# Patient Record
Sex: FEMALE | Race: WHITE | URBAN - METROPOLITAN AREA
[De-identification: names, ages, dates, MRNs, and addresses within clinical notes are randomized per-mention and may not be internally consistent; named-entity substitution may affect disease eponyms.]

---

## 2017-02-07 ENCOUNTER — TELEPHONE (OUTPATIENT)
Dept: DERMATOLOGY CLINIC | Facility: CLINIC | Age: 67
End: 2017-02-07

## 2017-02-07 RX ORDER — DOXYCYCLINE HYCLATE 50 MG/1
50 CAPSULE ORAL DAILY
Qty: 30 CAPSULE | Refills: 12 | Status: SHIPPED | OUTPATIENT
Start: 2017-02-07 | End: 2017-06-14

## 2017-02-07 RX ORDER — DOXYCYCLINE 40 MG/1
CAPSULE ORAL
Qty: 30 CAPSULE | Refills: 0 | OUTPATIENT
Start: 2017-02-07

## 2017-02-07 NOTE — TELEPHONE ENCOUNTER
Can try 50 m G doxycycline hyclate daily. Not exactly the same however may be cheaper. Monohydrate might be cheaper still.   Alternative Periostat 20 mg twice daily may not be covered at all as this is for periodontal disease does not have rosacea indicat

## 2017-02-07 NOTE — TELEPHONE ENCOUNTER
Please advise on alterate medication?     Patient had rx transferred to [facility] at [address] to pharmacy and [facility] D.

## 2017-05-12 ENCOUNTER — TELEPHONE (OUTPATIENT)
Dept: OPHTHALMOLOGY | Facility: CLINIC | Age: 67
End: 2017-05-12

## 2017-05-12 NOTE — TELEPHONE ENCOUNTER
Spoke to patient. She has a decrease in her vision of the left eye and wants to be seen prior to 8/29/17. Patient is concerned.   Rescheduled patient to 6/14/17 at 1:00pm/nw

## 2017-05-12 NOTE — TELEPHONE ENCOUNTER
Pt requesting appt sooner than next available, states her L eye vision is blurry. Pls advise thank you.

## 2017-06-14 ENCOUNTER — OFFICE VISIT (OUTPATIENT)
Dept: OPHTHALMOLOGY | Facility: CLINIC | Age: 67
End: 2017-06-14

## 2017-06-14 DIAGNOSIS — H25.13 AGE-RELATED NUCLEAR CATARACT OF BOTH EYES: ICD-10-CM

## 2017-06-14 DIAGNOSIS — H40.003 GLAUCOMA SUSPECT, BILATERAL: Primary | ICD-10-CM

## 2017-06-14 DIAGNOSIS — H43.391 FLOATER, VITREOUS, RIGHT: ICD-10-CM

## 2017-06-14 PROBLEM — Z79.899 LONG-TERM USE OF PLAQUENIL: Status: ACTIVE | Noted: 2017-06-14

## 2017-06-14 PROBLEM — H40.009 GLAUCOMA SUSPECT: Status: ACTIVE | Noted: 2017-06-14

## 2017-06-14 PROBLEM — H43.399 FLOATER, VITREOUS: Status: ACTIVE | Noted: 2017-06-14

## 2017-06-14 PROBLEM — Z79.899 LONG-TERM USE OF PLAQUENIL: Status: RESOLVED | Noted: 2017-06-14 | Resolved: 2017-06-14

## 2017-06-14 PROCEDURE — 92015 DETERMINE REFRACTIVE STATE: CPT | Performed by: OPHTHALMOLOGY

## 2017-06-14 PROCEDURE — 92250 FUNDUS PHOTOGRAPHY W/I&R: CPT | Performed by: OPHTHALMOLOGY

## 2017-06-14 PROCEDURE — 92014 COMPRE OPH EXAM EST PT 1/>: CPT | Performed by: OPHTHALMOLOGY

## 2017-06-14 RX ORDER — LORATADINE 10 MG/1
10 TABLET ORAL DAILY
COMMUNITY

## 2017-06-14 RX ORDER — MULTIVIT-MIN/FOLIC ACID/LUTEIN 400-250MCG
1 TABLET,CHEWABLE ORAL DAILY
COMMUNITY

## 2017-06-14 NOTE — ASSESSMENT & PLAN NOTE
Discussed with patient that she is a glaucoma suspect based on increased cupping of the optic nerves in both eyes. Retinal photos taken today to document optic nerves. Glaucoma diagnostic testing ordered.   Will not start medication, but will continue to

## 2017-06-14 NOTE — PROGRESS NOTES
Yulissa Willingham is a 78year old female. HPI:     HPI     Pt complains of blurred vision in her left eye over the past 4 months. Pt has been OTC Systane OU prn. Patient is off of Plaquenil for the past few years.         Last edited by Nikolas Wilkinson (FOLVITE) 1 MG Oral Tab  Disp:  Rfl: 0   gabapentin (NEURONTIN) 300 MG Oral Cap  Disp:  Rfl: 2   Lisinopril-Hydrochlorothiazide 10-12.5 MG Oral Tab  Disp:  Rfl: 11   methotrexate (RHEUMATREX) 2.5 MG Oral Tab  Disp:  Rfl: 1   naproxen (NAPROSYN) 500 MG Oral glasses      Manifest Refraction (Auto)      Sphere Cylinder Axis Dist Add Near   Right -0.25 Sphere       Left -3.00 +0.50 180            Manifest Refraction #2      Sphere Cylinder Axis Dist Add Near   Right [place] Sphere  20/20 +2.25 20/20   Left -3.00 +

## 2017-06-14 NOTE — PATIENT INSTRUCTIONS
Age-related nuclear cataract of both eyes  Discussed with patient that since her prescription has changed, she could be a \"natural monovision. \"  She could use her right eye for distance and her left eye for near and she could go without glasses.   She was

## 2017-06-14 NOTE — ASSESSMENT & PLAN NOTE
Discussed with patient that since her prescription has changed, she could be a \"natural monovision. \"  She could use her right eye for distance and her left eye for near and she could go without glasses.   She was given optional progressive bifocals, dista

## 2017-07-26 ENCOUNTER — TELEPHONE (OUTPATIENT)
Dept: DERMATOLOGY CLINIC | Facility: CLINIC | Age: 67
End: 2017-07-26

## 2017-07-26 RX ORDER — MINOCYCLINE HYDROCHLORIDE 50 MG/1
50 CAPSULE ORAL DAILY
Qty: 30 CAPSULE | Refills: 6 | Status: SHIPPED | OUTPATIENT
Start: 2017-07-26 | End: 2018-03-04

## 2017-07-26 NOTE — TELEPHONE ENCOUNTER
Pt would like to change from doxycline and would like to change to minocyline per insurance it would be cheaper, pls advise

## 2017-07-26 NOTE — TELEPHONE ENCOUNTER
[id number] Rizwana Goss new rx sent for minocycline.  50mg qd take as she would the doxy not exactly equivalent but ok to try

## 2017-07-27 NOTE — TELEPHONE ENCOUNTER
Per Colt Brock there may be dizziness experieced with minocycline, but less GI issues with it.  Since pt has been on doxy with no problems, most likely she wont have any with minocycline - pt voiced understanding

## 2017-07-27 NOTE — TELEPHONE ENCOUNTER
Dizziness possible,  different from the doxy--otherwise whe has tolerated the doxy would not anticipate more gi/sun issues. May not work as well as the doxy.

## 2018-01-29 ENCOUNTER — TELEPHONE (OUTPATIENT)
Dept: OPHTHALMOLOGY | Facility: CLINIC | Age: 68
End: 2018-01-29

## 2018-01-29 NOTE — TELEPHONE ENCOUNTER
Pt states she has an eye appt with another doctor this week, states she needs her records, TITO informed pt that would be through medical records dept, pt states she contacted medical records and has not received response. Pls advise thank you.

## 2018-01-29 NOTE — TELEPHONE ENCOUNTER
Spoke with patient's  and told him that she can call [phone]. I also told him \"all medical requests for [id number] will now be handled through our record copy , Scan STAT.    Scan STAT is in the [facility]

## 2018-03-01 ENCOUNTER — OFFICE VISIT (OUTPATIENT)
Dept: OTOLARYNGOLOGY | Facility: CLINIC | Age: 68
End: 2018-03-01

## 2018-03-01 VITALS
WEIGHT: 108 LBS | TEMPERATURE: 98 F | DIASTOLIC BLOOD PRESSURE: 64 MMHG | SYSTOLIC BLOOD PRESSURE: 98 MMHG | HEIGHT: 63 IN | BODY MASS INDEX: 19.14 KG/M2

## 2018-03-01 DIAGNOSIS — S01.319A LACERATION OF EAR LOBE, UNSPECIFIED LATERALITY, INITIAL ENCOUNTER: Primary | ICD-10-CM

## 2018-03-01 PROCEDURE — 99203 OFFICE O/P NEW LOW 30 MIN: CPT | Performed by: OTOLARYNGOLOGY

## 2018-03-01 PROCEDURE — G0463 HOSPITAL OUTPT CLINIC VISIT: HCPCS | Performed by: OTOLARYNGOLOGY

## 2018-03-02 NOTE — PROGRESS NOTES
Nolan Ross is a 80year old female.   Patient presents with:  Ear Problem: pt reports would like both ear lobes repaired and cost      HISTORY OF PRESENT ILLNESS  She presents today to discuss possible repair of bilateral earlobes which have been irregular heartbeat/palpitations and syncope. GI Negative Abdominal pain and diarrhea. Endocrine Negative Cold intolerance and heat intolerance. Neuro Negative Tremors. Psych Negative Anxiety and depression.    Integumentary Negative Frequent skin i multiple vitamin Oral Chew Tab, Chew 1 tablet by mouth daily. , Disp: , Rfl:   •  FINACEA 15 % External Gel, APPLY TO AFFECTED AREA(S) EVERY MORNING, Disp: 50 g, Rfl: 2  •  TRETINOIN 0.025 % External Cream, APPLY TO AFFECTED AREA(S) EVERY DAY, Disp: 20 g, R several months before she repierces her ears. Janie Velez.  Lokesh Chung MD    3/1/2018    7:35 PM

## 2018-03-06 RX ORDER — MINOCYCLINE HYDROCHLORIDE 50 MG/1
CAPSULE ORAL
Qty: 30 CAPSULE | Refills: 0 | Status: SHIPPED | OUTPATIENT
Start: 2018-03-06 | End: 2018-04-02

## 2018-03-06 NOTE — TELEPHONE ENCOUNTER
Spoke with pt informed her that her prescription was called into pharmacy and advised her to make an f/u appt and pt verbalized understanding and went ahead and scheduled appt for April.

## 2018-03-07 ENCOUNTER — TELEPHONE (OUTPATIENT)
Dept: OTOLARYNGOLOGY | Facility: CLINIC | Age: 68
End: 2018-03-07

## 2018-03-07 NOTE — TELEPHONE ENCOUNTER
pt called. She canceled her procedure for 3/8/18 with TRENT. She asked me to send a message, she is upset no one has called her prior to this procedure and does not plan to return.

## 2018-03-07 NOTE — TELEPHONE ENCOUNTER
Spoke w/ pt and apologized for not calling regarding her inquiry about the cost of ear lobe repair. Pt informed per Rupert (coding dept), the price for an earlobe repair 2.6 - 5 cm would be approximately $565.   Pt verbalized understanding; she rescheduled he

## 2018-03-08 ENCOUNTER — OFFICE VISIT (OUTPATIENT)
Dept: OTOLARYNGOLOGY | Facility: CLINIC | Age: 68
End: 2018-03-08

## 2018-03-08 VITALS
BODY MASS INDEX: 19.14 KG/M2 | SYSTOLIC BLOOD PRESSURE: 100 MMHG | DIASTOLIC BLOOD PRESSURE: 63 MMHG | HEART RATE: 58 BPM | HEIGHT: 63 IN | WEIGHT: 108 LBS | TEMPERATURE: 96 F

## 2018-03-08 DIAGNOSIS — S01.319A LACERATION OF EAR LOBE, UNSPECIFIED LATERALITY, INITIAL ENCOUNTER: Primary | ICD-10-CM

## 2018-03-08 PROCEDURE — 12051 INTMD RPR FACE/MM 2.5 CM/<: CPT | Performed by: OTOLARYNGOLOGY

## 2018-03-08 RX ORDER — CEPHALEXIN 500 MG/1
500 CAPSULE ORAL EVERY 8 HOURS
Qty: 21 CAPSULE | Refills: 0 | Status: SHIPPED | OUTPATIENT
Start: 2018-03-08 | End: 2018-04-02 | Stop reason: ALTCHOICE

## 2018-03-09 ENCOUNTER — TELEPHONE (OUTPATIENT)
Dept: OTOLARYNGOLOGY | Facility: CLINIC | Age: 68
End: 2018-03-09

## 2018-03-09 NOTE — PROGRESS NOTES
Justin Sharma is a 80year old female.   Patient presents with:  Procedure: Earlobe Laceration Repair on LT side      HISTORY OF PRESENT ILLNESS  She presents today to discuss possible repair of bilateral earlobes which have been injured in the past vision and vision changes. Respiratory Negative Dyspnea and wheezing. Cardio Negative Chest pain, irregular heartbeat/palpitations and syncope. GI Negative Abdominal pain and diarrhea. Endocrine Negative Cold intolerance and heat intolerance.    Lm The left earlobe was infiltrated with 1% Xylocaine with 1-100,000 of epinephrine. A triangular portion of tissue was removed from the earlobe followed by intermediate repair.   Deep subcutaneous tissues were approximated and closed using 5-0 fast-absorbing , Disp: , Rfl: 11  •  methotrexate (RHEUMATREX) 2.5 MG Oral Tab, , Disp: , Rfl: 1  •  naproxen (NAPROSYN) 500 MG Oral Tab, , Disp: , Rfl: 2  •  Sertraline HCl (ZOLOFT) 100 MG Oral Tab, , Disp: , Rfl: 11  •  Zolpidem Tartrate (AMBIEN) 10 MG Oral Tab, , Disp

## 2018-03-09 NOTE — TELEPHONE ENCOUNTER
Pt seen yesterday for earlobe laceration Per pt is ear is not bleeding , pt woke up this morning and noticed small amount of blood on pillow and when pt cleans ear notices a small amount of blood.  Advise pt to monitor, continue to clean as advised and take

## 2018-03-27 ENCOUNTER — TELEPHONE (OUTPATIENT)
Dept: OTOLARYNGOLOGY | Facility: CLINIC | Age: 68
End: 2018-03-27

## 2018-03-27 NOTE — TELEPHONE ENCOUNTER
, please see note below. Pt had ear lobe laceration repair on 03/08/18 in office. Per pt has noticed not all stiches have dissolved.  Pt states she still has some stiches in the back of her ear and tip, pt asking if come in to have removed or give

## 2018-03-28 NOTE — TELEPHONE ENCOUNTER
Yumi Perdomo MD   You 1 hour ago (7:06 AM)      Have her come in for a nurse visit. If their is a stitch it can be removed by the nurses. Does not need to be seen by a physician for this.  All stitches used are dissolvable (Routing comment)

## 2018-03-28 NOTE — TELEPHONE ENCOUNTER
Kai Roberto nurses visit are not yet available to be scheduled.  Pt scheduled for follow up in office for stitch removal.

## 2018-03-29 NOTE — TELEPHONE ENCOUNTER
If she comes in on one of my days then a nurse can remove the stitch but she does not need to be seen by me or another physician.

## 2018-04-02 ENCOUNTER — OFFICE VISIT (OUTPATIENT)
Dept: DERMATOLOGY CLINIC | Facility: CLINIC | Age: 68
End: 2018-04-02

## 2018-04-02 ENCOUNTER — TELEPHONE (OUTPATIENT)
Dept: DERMATOLOGY CLINIC | Facility: CLINIC | Age: 68
End: 2018-04-02

## 2018-04-02 DIAGNOSIS — D22.9 MULTIPLE NEVI: ICD-10-CM

## 2018-04-02 DIAGNOSIS — D23.70 BENIGN NEOPLASM OF SKIN OF LOWER LIMB, INCLUDING HIP, UNSPECIFIED LATERALITY: ICD-10-CM

## 2018-04-02 DIAGNOSIS — L57.0 AK (ACTINIC KERATOSIS): Primary | ICD-10-CM

## 2018-04-02 DIAGNOSIS — L82.1 SEBORRHEIC KERATOSES: ICD-10-CM

## 2018-04-02 DIAGNOSIS — D23.5 BENIGN NEOPLASM OF SKIN OF TRUNK, EXCEPT SCROTUM: ICD-10-CM

## 2018-04-02 DIAGNOSIS — D23.30 BENIGN NEOPLASM OF SKIN OF FACE: ICD-10-CM

## 2018-04-02 DIAGNOSIS — D23.4 BENIGN NEOPLASM OF SCALP AND SKIN OF NECK: ICD-10-CM

## 2018-04-02 DIAGNOSIS — L81.4 LENTIGO: ICD-10-CM

## 2018-04-02 DIAGNOSIS — D23.60 BENIGN NEOPLASM OF SKIN OF UPPER LIMB, INCLUDING SHOULDER, UNSPECIFIED LATERALITY: ICD-10-CM

## 2018-04-02 PROCEDURE — 17000 DESTRUCT PREMALG LESION: CPT | Performed by: DERMATOLOGY

## 2018-04-02 PROCEDURE — 99213 OFFICE O/P EST LOW 20 MIN: CPT | Performed by: DERMATOLOGY

## 2018-04-02 PROCEDURE — 17003 DESTRUCT PREMALG LES 2-14: CPT | Performed by: DERMATOLOGY

## 2018-04-02 RX ORDER — TIMOLOL MALEATE 5 MG/ML
SOLUTION/ DROPS OPHTHALMIC
Refills: 3 | COMMUNITY
Start: 2018-03-23

## 2018-04-02 RX ORDER — MINOCYCLINE HYDROCHLORIDE 50 MG/1
CAPSULE ORAL
Qty: 30 CAPSULE | Refills: 12 | Status: SHIPPED | OUTPATIENT
Start: 2018-04-02 | End: 2019-04-04

## 2018-04-02 RX ORDER — AZELAIC ACID 0.15 G/G
GEL TOPICAL
Qty: 50 G | Refills: 2 | Status: SHIPPED | OUTPATIENT
Start: 2018-04-02 | End: 2022-01-03

## 2018-04-02 RX ORDER — BRIMONIDINE TARTRATE 2 MG/ML
SOLUTION/ DROPS OPHTHALMIC
Refills: 3 | COMMUNITY
Start: 2018-03-23

## 2018-04-02 RX ORDER — KETOTIFEN FUMARATE 0.35 MG/ML
1 SOLUTION/ DROPS OPHTHALMIC
COMMUNITY

## 2018-04-02 RX ORDER — LATANOPROST 50 UG/ML
SOLUTION/ DROPS OPHTHALMIC
Refills: 3 | COMMUNITY
Start: 2018-03-16

## 2018-04-02 RX ORDER — TRETINOIN 0.025 %
CREAM (GRAM) TOPICAL
Qty: 20 G | Refills: 3 | Status: SHIPPED | OUTPATIENT
Start: 2018-04-02 | End: 2020-11-11

## 2018-04-03 NOTE — TELEPHONE ENCOUNTER
Please for rosacea and ak's --pt has been using to face and chest x year with improved management of both.   Medically necessary

## 2018-04-15 NOTE — PROGRESS NOTES
Kira Sanchez is a 80year old female. HPI:     CC:  Patient presents with:  Full Skin Exam: Established pt. LOV- 9-12-16. Pt is presenting today for full body skin rough scaly spots to chest, and lesion to right temple.  Pt denies a personal/family Oral Tab  Disp:  Rfl: 4   Desonide (DESOWEN) 0.05 % Apply Externally Cream Apply  topically. Disp:  Rfl:    triamcinolone acetonide (KENALOG) 0.1 % Apply Externally Cream Apply  topically.  Disp:  Rfl:    anastrozole (ARIMIDEX) 1 MG Oral Tab tab Take  by mo week     Drug use: No    Sexual activity: Not on file     Other Topics Concern    Pt has a pacemaker No    Pt has a defibrillator No    Reaction to local anesthetic No     Social History Narrative   None on file     Family History   Problem Relation Age of Cap 30 capsule 12      Sig: TAKE 1 CAPSULE(50 MG) BY MOUTH DAILY      Azelaic Acid (FINACEA) 15 % External Gel 50 g 2      Sig: APPLY TO AFFECTED AREA(S) EVERY MORNING      tretinoin 0.025 % External Cream 20 g 3      Sig: APPLY TO AFFECTED AREA(S) EVERY D

## 2018-07-09 ENCOUNTER — OFFICE VISIT (OUTPATIENT)
Dept: DERMATOLOGY CLINIC | Facility: CLINIC | Age: 68
End: 2018-07-09

## 2018-07-09 DIAGNOSIS — L57.0 AK (ACTINIC KERATOSIS): Primary | ICD-10-CM

## 2018-07-09 DIAGNOSIS — D23.70 BENIGN NEOPLASM OF SKIN OF LOWER LIMB, INCLUDING HIP, UNSPECIFIED LATERALITY: ICD-10-CM

## 2018-07-09 DIAGNOSIS — D23.60 BENIGN NEOPLASM OF SKIN OF UPPER LIMB, INCLUDING SHOULDER, UNSPECIFIED LATERALITY: ICD-10-CM

## 2018-07-09 DIAGNOSIS — D23.5 BENIGN NEOPLASM OF SKIN OF TRUNK, EXCEPT SCROTUM: ICD-10-CM

## 2018-07-09 DIAGNOSIS — D23.4 BENIGN NEOPLASM OF SCALP AND SKIN OF NECK: ICD-10-CM

## 2018-07-09 DIAGNOSIS — L81.4 LENTIGO: ICD-10-CM

## 2018-07-09 DIAGNOSIS — D22.9 MULTIPLE NEVI: ICD-10-CM

## 2018-07-09 DIAGNOSIS — D23.30 BENIGN NEOPLASM OF SKIN OF FACE: ICD-10-CM

## 2018-07-09 DIAGNOSIS — L82.1 SEBORRHEIC KERATOSES: ICD-10-CM

## 2018-07-09 PROCEDURE — 99213 OFFICE O/P EST LOW 20 MIN: CPT | Performed by: DERMATOLOGY

## 2018-07-09 PROCEDURE — 17003 DESTRUCT PREMALG LES 2-14: CPT | Performed by: DERMATOLOGY

## 2018-07-09 PROCEDURE — 17000 DESTRUCT PREMALG LESION: CPT | Performed by: DERMATOLOGY

## 2018-07-09 NOTE — PROGRESS NOTES
Past Medical History:   Diagnosis Date   • Age-related nuclear cataract of both eyes 8/5/2014   • Allergic contact dermatitis of eyelids of both eyes 11/17/2015   • Breast cancer ([facility] [address]     per [employer]:  lumpectomy   • Eye exam due to high risk medication,

## 2018-07-22 NOTE — PROGRESS NOTES
Jazmyne Roper is a 80year old female. HPI:     CC:  Patient presents with:  Lesion: LOV 4/2/2018. Pt presenting with lesion to L upper leg and chest. Pt denies personal and famiy hx of skin cancer.         Allergies:  Adhesive Tape; Dust Mite Extra Externally Cream Apply  topically. Disp:  Rfl:    triamcinolone acetonide (KENALOG) 0.1 % Apply Externally Cream Apply  topically. Disp:  Rfl:    anastrozole (ARIMIDEX) 1 MG Oral Tab tab Take  by mouth.  Disp:  Rfl:    Fluticasone Propionate (FLONASE) 50 MC has a pacemaker No    Pt has a defibrillator No    Reaction to local anesthetic No     Social History Narrative   None on file     Family History   Problem Relation Age of Onset   • Diabetes Mother    • Glaucoma Neg    • Macular degeneration Neg        The skin of face  Benign neoplasm of skin of lower limb, including hip, unspecified laterality  Benign neoplasm of skin of upper limb, including shoulder, unspecified laterality  Benign neoplasm of skin of trunk, except scrotum  Multiple nevi  Lesion of concer

## 2019-04-04 RX ORDER — MINOCYCLINE HYDROCHLORIDE 50 MG/1
CAPSULE ORAL
Qty: 30 CAPSULE | Refills: 0 | Status: SHIPPED | OUTPATIENT
Start: 2019-04-04 | End: 2019-05-08

## 2019-04-17 ENCOUNTER — TELEPHONE (OUTPATIENT)
Dept: DERMATOLOGY CLINIC | Facility: CLINIC | Age: 69
End: 2019-04-17

## 2019-04-17 NOTE — TELEPHONE ENCOUNTER
LOV 7/9/18 pt with a \"bump-like\" lesion to left leg. States she's had it since dec 2018 but the lesion has become sore.  Appt made for end of April (next available)

## 2019-05-06 ENCOUNTER — OFFICE VISIT (OUTPATIENT)
Dept: DERMATOLOGY CLINIC | Facility: CLINIC | Age: 69
End: 2019-05-06
Payer: MEDICARE

## 2019-05-06 DIAGNOSIS — L71.9 ROSACEA: ICD-10-CM

## 2019-05-06 DIAGNOSIS — D23.5 BENIGN NEOPLASM OF SKIN OF TRUNK, EXCEPT SCROTUM: ICD-10-CM

## 2019-05-06 DIAGNOSIS — L57.0 AK (ACTINIC KERATOSIS): Primary | ICD-10-CM

## 2019-05-06 DIAGNOSIS — D23.4 BENIGN NEOPLASM OF SCALP AND SKIN OF NECK: ICD-10-CM

## 2019-05-06 DIAGNOSIS — D23.30 BENIGN NEOPLASM OF SKIN OF FACE: ICD-10-CM

## 2019-05-06 DIAGNOSIS — L82.1 SEBORRHEIC KERATOSES: ICD-10-CM

## 2019-05-06 DIAGNOSIS — D23.60 BENIGN NEOPLASM OF SKIN OF UPPER LIMB, INCLUDING SHOULDER, UNSPECIFIED LATERALITY: ICD-10-CM

## 2019-05-06 DIAGNOSIS — D22.9 MULTIPLE NEVI: ICD-10-CM

## 2019-05-06 DIAGNOSIS — L81.4 LENTIGO: ICD-10-CM

## 2019-05-06 PROCEDURE — G0463 HOSPITAL OUTPT CLINIC VISIT: HCPCS | Performed by: DERMATOLOGY

## 2019-05-06 PROCEDURE — 99213 OFFICE O/P EST LOW 20 MIN: CPT | Performed by: DERMATOLOGY

## 2019-05-06 RX ORDER — ALENDRONATE SODIUM 70 MG/1
70 TABLET ORAL
COMMUNITY

## 2019-05-06 RX ORDER — ALENDRONATE SODIUM 70 MG/1
TABLET ORAL
Refills: 2 | COMMUNITY
Start: 2019-02-27

## 2019-05-06 RX ORDER — ERYTHROMYCIN 5 MG/G
OINTMENT OPHTHALMIC
Refills: 3 | COMMUNITY
Start: 2019-02-15

## 2019-05-06 RX ORDER — MINOCYCLINE HCL
POWDER (GRAM) MISCELLANEOUS
COMMUNITY

## 2019-05-06 RX ORDER — POLYETHYLENE GLYCOL-3350 AND ELECTROLYTES 236; 6.74; 5.86; 2.97; 22.74 G/274.31G; G/274.31G; G/274.31G; G/274.31G; G/274.31G
POWDER, FOR SOLUTION ORAL
Refills: 0 | COMMUNITY
Start: 2019-03-07

## 2019-05-06 RX ORDER — NEOMYCIN SULFATE, POLYMYXIN B SULFATE, AND DEXAMETHASONE 3.5; 10000; 1 MG/G; [USP'U]/G; MG/G
OINTMENT OPHTHALMIC
Refills: 0 | COMMUNITY
Start: 2019-01-22

## 2019-05-06 RX ORDER — PREDNISOLONE ACETATE 10 MG/ML
SUSPENSION/ DROPS OPHTHALMIC
COMMUNITY
Start: 2019-02-08

## 2019-05-09 RX ORDER — MINOCYCLINE HYDROCHLORIDE 50 MG/1
CAPSULE ORAL
Qty: 60 CAPSULE | Refills: 11 | Status: SHIPPED | OUTPATIENT
Start: 2019-05-09 | End: 2020-07-03

## 2019-05-19 NOTE — PROGRESS NOTES
Maggie Hou is a 77year old female. HPI:     CC:  Patient presents with:  Actinic Keratosis: LOV 7/9/18. pt presenting today with f/u on Actinic Keratosis. pt states needs a refill on Minocycline. Lesion: pt concerned about lesion to left shin. Ophthalmic Solution INT 1 GTT INTO OU BID Disp:  Rfl: 3   Ketotifen Fumarate 0.025 % Ophthalmic Solution 1 drop.  Disp:  Rfl:    latanoprost 0.005 % Ophthalmic Solution INT 1 GTT INTO OU NIGHTLY Disp:  Rfl: 3   Timolol Maleate 0.5 % Ophthalmic Solution INT Date   • Age-related nuclear cataract of both eyes 8/5/2014   • Allergic contact dermatitis of eyelids of both eyes 11/17/2015   • Breast cancer ([facility] [address]     per [employer]:  lumpectomy   • Eye exam due to high risk medication, encounter for    • Long-term use o occupation: Not Asked        Pt has a pacemaker: No        Pt has a defibrillator: No        Breast feeding: Not Asked        Reaction to local anesthetic: No    Social History Narrative      Not on file    Family History   Problem Relation Age of Onset uniformly pigmented, macules and papules 6 mm and less scattered on exam. pigmented lesions examined with dermoscopy benign-appearing patterns. Waxy tannish keratotic papules scattered, cherry-red vascular papules scattered.     See map today's date for lesions. Extensive lentigines, sun damage. Continue regular follow-up    Please refer to map for specific lesions. See additional diagnoses. Pros cons of various therapies, risks benefits discussed. Pathophysiology discussed with patient.   Therapeutic

## 2020-06-29 ENCOUNTER — OFFICE VISIT (OUTPATIENT)
Dept: GASTROENTEROLOGY | Facility: CLINIC | Age: 70
End: 2020-06-29
Payer: MEDICARE

## 2020-06-29 VITALS
DIASTOLIC BLOOD PRESSURE: 56 MMHG | WEIGHT: 107 LBS | HEART RATE: 58 BPM | HEIGHT: 63 IN | BODY MASS INDEX: 18.96 KG/M2 | SYSTOLIC BLOOD PRESSURE: 87 MMHG

## 2020-06-29 DIAGNOSIS — R19.7 DIARRHEA, UNSPECIFIED TYPE: Primary | ICD-10-CM

## 2020-06-29 PROCEDURE — G0463 HOSPITAL OUTPT CLINIC VISIT: HCPCS | Performed by: INTERNAL MEDICINE

## 2020-06-29 PROCEDURE — 99203 OFFICE O/P NEW LOW 30 MIN: CPT | Performed by: INTERNAL MEDICINE

## 2020-07-03 NOTE — PROGRESS NOTES
HPI:    Patient ID: Carlos Bonilla is a 72year old female. HPI  The patient is self-referred. She is seen today with her . She is an excellent historian. The patient has a history of rheumatoid arthritis.   She has been on Cimzia for t 06/29/20 : 107 lb (48.5 kg)  03/08/18 : 108 lb (49 kg)  03/01/18 : 108 lb (49 kg)           Current Outpatient Medications   Medication Sig Dispense Refill   • Minocycline HCl Does not apply Powder Take by mouth.      • alendronate 70 MG Oral Tab Take 70 mg • Lisinopril-Hydrochlorothiazide 10-12.5 MG Oral Tab   11   • methotrexate (RHEUMATREX) 2.5 MG Oral Tab   1   • naproxen (NAPROSYN) 500 MG Oral Tab   2   • Sertraline HCl (ZOLOFT) 100 MG Oral Tab   11   • Zolpidem Tartrate (AMBIEN) 10 MG Oral Tab   4   • M NOT DETECTED   NOT DETECTED   NOT DETECTED   NOT DETECTED   NOT DETECTED   NOT DETECTED   NOT DETECTED   NOT DETECTED   NOT DETECTED   NOT DETECTED   NOT DETECTED   CAMPYLOBACTER   C. DIFFICILE TOXIN A/B   PLEIS.  SHIGELLOIDES   SALMONELLA   VIBRIO SPECIES Meds This Visit:  Requested Prescriptions      No prescriptions requested or ordered in this encounter       Imaging & Referrals:  None       UB#[id number]

## 2020-07-09 ENCOUNTER — TELEPHONE (OUTPATIENT)
Dept: GASTROENTEROLOGY | Facility: CLINIC | Age: 70
End: 2020-07-09

## 2020-07-09 NOTE — TELEPHONE ENCOUNTER
Dr. Toby Castro I spoke with patient she is still having the same diarrhea. She is taking 8 imodium/day with no relief. She denies any fevers. She states her symptoms are the exact same as when they started in March.     She would like to try kj

## 2020-07-09 NOTE — TELEPHONE ENCOUNTER
Patient called in stating that the Imodium is not working properly. She would like to talk about a different benesodie medication that the  had talked about previously.  Patient is requesting to speak directly to Dr. Triplett Grace [phone] Please advise

## 2020-07-09 NOTE — TELEPHONE ENCOUNTER
Diagnosis: Diarrhea. May use a fleets enema preparation. No sedation, IV sedation or monitored anesthesia care per patient preference/scheduling.

## 2020-07-09 NOTE — TELEPHONE ENCOUNTER
I cannot begin budesonide unless we have a definite diagnosis of microscopic colitis. This would require at least a flexible sigmoidoscopy with biopsies of the left colon.   Additional options would include a stool test to detect inflammation (a positive t

## 2020-07-09 NOTE — TELEPHONE ENCOUNTER
GI Schedulers:    Please contact patient to scheduled sigmoidoscopy per Dr Jaqui Suarez orders below. She is on lisinopril-HCTZ. Thank You.

## 2020-07-09 NOTE — TELEPHONE ENCOUNTER
Dr. Karrie Hackett is agreeable to a sigmoidoscopy for diagnosis. Please provide orders and preferred prep/enema.     Thank You

## 2020-07-13 NOTE — TELEPHONE ENCOUNTER
Spoke with kahti she is waiting to speak with the surgery schedulers.     PHONE ROOM: Please route further calls to GI Schedulers @ [facility] SURG/PROC scheduling

## 2020-07-13 NOTE — TELEPHONE ENCOUNTER
GI schedulers:    Please contact patient to schedule sigmoidoscopy see orders in notes below    Thank you

## 2020-07-31 NOTE — TELEPHONE ENCOUNTER
Left message to call back to see if patient is transferring care to [facility]. Op report copied below from care everywhere    Sig.  Completed @ [place] by Dr. Piedra File on 7-, waiting path results from care everywhere

## 2020-07-31 NOTE — TELEPHONE ENCOUNTER
Sigmoidoscopy (07/30/2020 7:29 AM CDT)  Sigmoidoscopy (07/30/2020 7:29 AM CDT)   Component Value Ref Range Performed At Pathologist [address]  GI  _______________________________________________________________                   monitored continuously. The [id number] was                                   introduced through the anus and advanced to                                   the splenic flexure.  The flexible                                   sigmoidoscopy was 8:57:26 AM  This report has been signed electronically.   Number of Addenda: 0    Note Initiated On: 7/30/2020 7:29 AM

## 2020-08-03 NOTE — TELEPHONE ENCOUNTER
Left message to call back to clarify if pt is transferring care to [facility]

## 2020-08-27 RX ORDER — MINOCYCLINE HYDROCHLORIDE 50 MG/1
CAPSULE ORAL
Qty: 60 CAPSULE | Refills: 0 | Status: SHIPPED | OUTPATIENT
Start: 2020-08-27 | End: 2020-09-17

## 2020-09-16 NOTE — TELEPHONE ENCOUNTER
Patient called-- yearly check scheduled for 11/11/20.     Asking for refill of Minocycline HCl 50 MG Oral Cap    LOV 5/6/2019

## 2020-09-17 RX ORDER — MINOCYCLINE HYDROCHLORIDE 50 MG/1
CAPSULE ORAL
Qty: 60 CAPSULE | Refills: 1 | Status: SHIPPED | OUTPATIENT
Start: 2020-09-17 | End: 2020-11-05

## 2020-11-05 RX ORDER — MINOCYCLINE HYDROCHLORIDE 50 MG/1
CAPSULE ORAL
Qty: 60 CAPSULE | Refills: 0 | Status: SHIPPED | OUTPATIENT
Start: 2020-11-05 | End: 2021-03-06

## 2020-11-05 NOTE — TELEPHONE ENCOUNTER
Patient called-    Asking for refill of Minocycline. Has appt on 11/11 but will be out of medication.  Please call     LOV 5/6/2019

## 2020-11-05 NOTE — TELEPHONE ENCOUNTER
Dr. Rajendra العراقي, Please see message below an advise for refill of Minocycline. LOV 5/6/19     next appointment  11/11/20. Per patient will be out of medication before appointment.     Refill pended

## 2020-11-11 ENCOUNTER — OFFICE VISIT (OUTPATIENT)
Dept: DERMATOLOGY CLINIC | Facility: CLINIC | Age: 70
End: 2020-11-11
Payer: MEDICARE

## 2020-11-11 DIAGNOSIS — D23.4 BENIGN NEOPLASM OF SCALP AND SKIN OF NECK: ICD-10-CM

## 2020-11-11 DIAGNOSIS — C44.629 SCC (SQUAMOUS CELL CARCINOMA), ARM, LEFT: ICD-10-CM

## 2020-11-11 DIAGNOSIS — L82.1 SEBORRHEIC KERATOSES: ICD-10-CM

## 2020-11-11 DIAGNOSIS — L81.4 LENTIGO: ICD-10-CM

## 2020-11-11 DIAGNOSIS — D48.5 NEOPLASM OF UNCERTAIN BEHAVIOR OF SKIN: Primary | ICD-10-CM

## 2020-11-11 DIAGNOSIS — D22.9 MULTIPLE NEVI: ICD-10-CM

## 2020-11-11 DIAGNOSIS — D23.5 BENIGN NEOPLASM OF SKIN OF TRUNK, EXCEPT SCROTUM: ICD-10-CM

## 2020-11-11 DIAGNOSIS — L71.9 ROSACEA: ICD-10-CM

## 2020-11-11 DIAGNOSIS — D23.60 BENIGN NEOPLASM OF SKIN OF UPPER LIMB, INCLUDING SHOULDER, UNSPECIFIED LATERALITY: ICD-10-CM

## 2020-11-11 DIAGNOSIS — D23.30 BENIGN NEOPLASM OF SKIN OF FACE: ICD-10-CM

## 2020-11-11 DIAGNOSIS — L57.0 AK (ACTINIC KERATOSIS): ICD-10-CM

## 2020-11-11 PROCEDURE — 99213 OFFICE O/P EST LOW 20 MIN: CPT | Performed by: DERMATOLOGY

## 2020-11-11 PROCEDURE — 88305 TISSUE EXAM BY PATHOLOGIST: CPT | Performed by: DERMATOLOGY

## 2020-11-11 PROCEDURE — 17262 DSTRJ MAL LES T/A/L 1.1-2.0: CPT | Performed by: DERMATOLOGY

## 2020-11-11 PROCEDURE — 17003 DESTRUCT PREMALG LES 2-14: CPT | Performed by: DERMATOLOGY

## 2020-11-11 PROCEDURE — 17000 DESTRUCT PREMALG LESION: CPT | Performed by: DERMATOLOGY

## 2020-11-11 PROCEDURE — G0463 HOSPITAL OUTPT CLINIC VISIT: HCPCS | Performed by: DERMATOLOGY

## 2020-11-11 RX ORDER — TRETINOIN 0.025 %
CREAM (GRAM) TOPICAL
Qty: 20 G | Refills: 3 | Status: SHIPPED | OUTPATIENT
Start: 2020-11-11

## 2020-11-12 ENCOUNTER — TELEPHONE (OUTPATIENT)
Dept: DERMATOLOGY CLINIC | Facility: CLINIC | Age: 70
End: 2020-11-12

## 2020-11-13 NOTE — PROGRESS NOTES
The pathology report from last visit showed  SCC well diff,   left anterior upper arm E&c'ed at visit. Should have been adequately treated. Continue sun protection. Please log in test results.   Please call patient and inform of results and recommendation

## 2020-11-13 NOTE — PROGRESS NOTES
Spoke with patient. Verified name and . Informed patient of test results per Dr. Coleman Garcia and recommendations and to return check for full skin exam in 4-6 months if healing well or as needed.  Patient verbalizes understanding of test results and recommend

## 2020-11-17 ENCOUNTER — OFFICE VISIT (OUTPATIENT)
Dept: OTOLARYNGOLOGY | Facility: CLINIC | Age: 70
End: 2020-11-17
Payer: MEDICARE

## 2020-11-17 VITALS
HEART RATE: 50 BPM | SYSTOLIC BLOOD PRESSURE: 116 MMHG | WEIGHT: 107 LBS | BODY MASS INDEX: 18.96 KG/M2 | HEIGHT: 63 IN | DIASTOLIC BLOOD PRESSURE: 74 MMHG

## 2020-11-17 DIAGNOSIS — H61.23 BILATERAL IMPACTED CERUMEN: Primary | ICD-10-CM

## 2020-11-17 PROCEDURE — 69210 REMOVE IMPACTED EAR WAX UNI: CPT | Performed by: OTOLARYNGOLOGY

## 2020-11-17 NOTE — PROGRESS NOTES
Gaetano Tuttle is a 72year old female.   Patient presents with:  Hearing Problem: c/o difficulty hearing from Left ear, reports recent water in her ear      HISTORY OF PRESENT ILLNESS  She presents today to discuss possible repair of bilateral earlob SCC (squamous cell carcinoma), arm, left 11/13/2020    left anterio upper arm       Past Surgical History:   Procedure Laterality Date   • COLONOSCOPY         REVIEW OF SYSTEMS    System Neg/Pos Details   Constitutional Negative Fatigue, fever and weight l •  Minocycline HCl 50 MG Oral Cap, TAKE ONE CAPSULE BY MOUTH DAILY, Disp: 60 capsule, Rfl: 0  •  alendronate 70 MG Oral Tab, TK 1 T PO WEEKLY, Disp: , Rfl: 2  •  Ketotifen Fumarate 0.025 % Ophthalmic Solution, 1 drop., Disp: , Rfl:   •  latanoprost 0.005 Disp: , Rfl: 0  •  GAVILYTE-G 236 g Oral Recon Soln, TAKE 4000 ML PO AS ONE DOSE, Disp: , Rfl: 0  •  prednisoLONE acetate 1 % Ophthalmic Suspension, , Disp: , Rfl:   •  brimonidine Tartrate 0.2 % Ophthalmic Solution, INT 1 GTT INTO OU BID, Disp: , Rfl: 3

## 2020-11-22 NOTE — PROGRESS NOTES
Kira Sanchez is a 72year old female.   HPI:     CC:  Patient presents with:  Full Skin Exam: pt is here for full body check, denies personal and family HX of skin cancer, LOV 5/6/19   Lesion: pt c/o bump/ lesions on arms, shoulders,    Derm Problem up to 2 weeks.      • hydrocortisone 2.5 % External Ointment CARA THIN LAYER EXT AA BID FOR UP TO 2 WKS PRN  1   • Neomycin-Polymyxin-Dexameth 3.5-46116-4.1 Ophthalmic Ointment   0   • GAVILYTE-G 236 g Oral Recon Soln TAKE 4000 ML PO AS ONE DOSE  0   • predn of eyelids of both eyes 11/17/2015   • Breast cancer ([facility] [address]     per [employer]:  lumpectomy   • Eye exam due to high risk medication, encounter for    • Long-term use of Plaquenil    • Other and unspecified hyperlipidemia    • Rosacea    • SCC (squamous cell c Asked        Outdoor occupation: Not Asked        Pt has a pacemaker: No        Pt has a defibrillator: No        Breast feeding: Not Asked        Reaction to local anesthetic: No    Social History Narrative      Not on file    Family History   Problem Rel uniformly pigmented, macules and papules 6 mm and less scattered on exam. pigmented lesions examined with dermoscopy benign-appearing patterns. Waxy tannish keratotic papules scattered, cherry-red vascular papules scattered.     See map today's date for reassurance given    Erythematous scaling keratotic papules  Arms hands chestx6 actinic keratoses. Precancerous nature discussed. Lesions treated with cryo- . Biopsy if not resolved. x6    More diffuse sun damage consider additional topicals.     [employer] voice recognition software. Please contact me regarding any confusion resulting from errors in recognition. The patient indicates understanding of these issues and agrees to the plan.   The patient is asked to return as noted in follow-up/ abov

## 2020-12-05 ENCOUNTER — LAB REQUISITION (OUTPATIENT)
Dept: LAB | Facility: HOSPITAL | Age: 70
End: 2020-12-05
Payer: MEDICARE

## 2020-12-05 DIAGNOSIS — Z01.818 ENCOUNTER FOR OTHER PREPROCEDURAL EXAMINATION: ICD-10-CM

## 2021-03-06 RX ORDER — MINOCYCLINE HYDROCHLORIDE 50 MG/1
CAPSULE ORAL
Qty: 60 CAPSULE | Refills: 1 | Status: SHIPPED | OUTPATIENT
Start: 2021-03-06 | End: 2021-09-01

## 2021-04-26 ENCOUNTER — TELEPHONE (OUTPATIENT)
Dept: DERMATOLOGY CLINIC | Facility: CLINIC | Age: 71
End: 2021-04-26

## 2021-04-26 NOTE — TELEPHONE ENCOUNTER
S/w pt to inquire why we are sending the path to Dr. Lyubov Siegel. Pt had a bx done Nov 2020 which revelaed SCC, see copied note: \"The pathology report from last visit showed  SCC well diff,   left anterior upper arm E&c'ed at visit. Should have been adequately treated. Continue sun protection. Please log in test results. Please call patient and inform of results and recommendations.  (please add to history). Pt to  rtc for f/u skin exam 4-6 mos if healing well   or prn. \"    Pt states she saw her PCP Dr. Ann Carlson and she referred her to Dr. Lyubov Siegel for further tx of this SCC - I did inform pt that this was treated by [facility] and she is due for F/U - pt states Dr. Ann Carlson told her this needs to be removed surgically - pt insists on sending path to Dr. Lyubov Siegel - staff please fax

## 2021-04-27 NOTE — TELEPHONE ENCOUNTER
Patient may of course seek therapy however if there is an issue would have been appreciated if patient discussed with our office. Of course may seek dermatology care elsewhere.     Handout from PCP indicates ENC is appropriate treatment for some skin cancers as determined by dermatologist.  Not clear why PCP determined this needs further surgery    Okay to move up her appointment or cancel altogether if she wishes    Fine to send path to Dr. Brody Shultz

## 2021-04-28 NOTE — TELEPHONE ENCOUNTER
Dr Huffman [place] office  Fax # [phone] call back and states didn't received the path report.  Please resend

## 2021-06-21 ENCOUNTER — MED REC SCAN ONLY (OUTPATIENT)
Dept: DERMATOLOGY CLINIC | Facility: CLINIC | Age: 71
End: 2021-06-21

## 2021-06-23 ENCOUNTER — OFFICE VISIT (OUTPATIENT)
Dept: DERMATOLOGY CLINIC | Facility: CLINIC | Age: 71
End: 2021-06-23
Payer: MEDICARE

## 2021-06-23 DIAGNOSIS — D23.9 BENIGN NEOPLASM OF SKIN, UNSPECIFIED LOCATION: ICD-10-CM

## 2021-06-23 DIAGNOSIS — D22.9 MULTIPLE NEVI: ICD-10-CM

## 2021-06-23 DIAGNOSIS — Z85.828 HISTORY OF NONMELANOMA SKIN CANCER: ICD-10-CM

## 2021-06-23 DIAGNOSIS — L82.1 SEBORRHEIC KERATOSES: ICD-10-CM

## 2021-06-23 DIAGNOSIS — L57.0 AK (ACTINIC KERATOSIS): Primary | ICD-10-CM

## 2021-06-23 DIAGNOSIS — L81.4 LENTIGO: ICD-10-CM

## 2021-06-23 PROCEDURE — 17003 DESTRUCT PREMALG LES 2-14: CPT | Performed by: DERMATOLOGY

## 2021-06-23 PROCEDURE — 17000 DESTRUCT PREMALG LESION: CPT | Performed by: DERMATOLOGY

## 2021-06-23 PROCEDURE — 99213 OFFICE O/P EST LOW 20 MIN: CPT | Performed by: DERMATOLOGY

## 2021-06-28 NOTE — PROGRESS NOTES
Rosemary Cedeno is a 80year old female.   HPI:     CC:  Patient presents with:  Full Skin Exam: LOV 11/11/20 Patient present for full body skin exam.Patient has hx of scc         Allergies:  Adhesive Tape and Dust Mite Extract    HISTORY:    Past Medi EXT AA BID FOR UP TO 2 WKS PRN  1   • Neomycin-Polymyxin-Dexameth 3.5-20245-7.1 Ophthalmic Ointment   0   • GAVILYTE-G 236 g Oral Recon Soln TAKE 4000 ML PO AS ONE DOSE  0   • prednisoLONE acetate 1 % Ophthalmic Suspension      • brimonidine Tartrate 0.2 % cataract of both eyes 8/5/2014   • Allergic contact dermatitis of eyelids of both eyes 11/17/2015   • [facility]     per [employer]:  lumpectomy   • Eye exam due to high risk medication, encounter for    • Long-term use of Plaquenil    • Other and un Frequency of Social Gatherings with Friends and Family:       Attends Yarsanism Services:       Active Member of Clubs or Organizations:       Attends Club or Organization Meetings:       Marital Status:   Intimate Partner Violence:       Fear of Current o chest,/ breasts, axillae,  abdomen, arms, legs, palms. Multiple light to medium brown, well marginated, uniformly pigmented, macules and papules 6 mm and less scattered on exam. pigmented lesions examined with dermoscopy benign-appearing patterns. continue current medication. Consider tapering few larger inflammatory papular nodules at times. Continue topicals also refill if needed see medications in grid. Skin care regimen discussed at length including cleansers, makeup, face washing, sunscreen.

## 2021-07-26 ENCOUNTER — OFFICE VISIT (OUTPATIENT)
Dept: OTOLARYNGOLOGY | Facility: CLINIC | Age: 71
End: 2021-07-26
Payer: MEDICARE

## 2021-07-26 VITALS — WEIGHT: 108 LBS | BODY MASS INDEX: 18.44 KG/M2 | TEMPERATURE: 98 F | HEIGHT: 64 IN

## 2021-07-26 DIAGNOSIS — H61.23 BILATERAL IMPACTED CERUMEN: Primary | ICD-10-CM

## 2021-07-26 PROCEDURE — 69210 REMOVE IMPACTED EAR WAX UNI: CPT | Performed by: OTOLARYNGOLOGY

## 2021-09-01 RX ORDER — MINOCYCLINE HYDROCHLORIDE 50 MG/1
CAPSULE ORAL
Qty: 60 CAPSULE | Refills: 1 | Status: SHIPPED | OUTPATIENT
Start: 2021-09-01 | End: 2021-12-31

## 2021-09-15 ENCOUNTER — OFFICE VISIT (OUTPATIENT)
Dept: DERMATOLOGY CLINIC | Facility: CLINIC | Age: 71
End: 2021-09-15
Payer: MEDICARE

## 2021-09-15 DIAGNOSIS — Z85.828 HISTORY OF NONMELANOMA SKIN CANCER: ICD-10-CM

## 2021-09-15 DIAGNOSIS — L81.4 LENTIGO: ICD-10-CM

## 2021-09-15 DIAGNOSIS — L57.0 AK (ACTINIC KERATOSIS): Primary | ICD-10-CM

## 2021-09-15 DIAGNOSIS — D23.9 BENIGN NEOPLASM OF SKIN, UNSPECIFIED LOCATION: ICD-10-CM

## 2021-09-15 DIAGNOSIS — L57.8 SUN-DAMAGED SKIN: ICD-10-CM

## 2021-09-15 DIAGNOSIS — L82.1 SEBORRHEIC KERATOSES: ICD-10-CM

## 2021-09-15 PROCEDURE — 99213 OFFICE O/P EST LOW 20 MIN: CPT | Performed by: DERMATOLOGY

## 2021-09-15 PROCEDURE — 17003 DESTRUCT PREMALG LES 2-14: CPT | Performed by: DERMATOLOGY

## 2021-09-15 PROCEDURE — 17000 DESTRUCT PREMALG LESION: CPT | Performed by: DERMATOLOGY

## 2021-09-16 NOTE — PROGRESS NOTES
Add tretinoin for ak's chest and arms and face especially 8 active ak's after efudex at risk for aggressive scc, chemoprevention necessary

## 2021-09-26 NOTE — PROGRESS NOTES
Remington Amador is a 80year old female.   HPI:     CC:  Patient presents with:  Derm Problem: pt presents for follow up on lesion on LT wrist, pt states has cleared,  denies family HX of skin cancer, personal HX of AK's,  LOV 6/23/2021         Angela Brannon Ointment   3   • hydrocortisone 2.5 % External Ointment Apply thin layer to affected area twice daily as needed for up to 2 weeks.      • hydrocortisone 2.5 % External Ointment CARA THIN LAYER EXT AA BID FOR UP TO 2 WKS PRN  1   • Neomycin-Polymyxin-Dexameth History:   Diagnosis Date   • Age-related nuclear cataract of both eyes 8/5/2014   • Allergic contact dermatitis of eyelids of both eyes 11/17/2015   • Breast cancer ([facility] [address]     per [employer]:  lumpectomy   • Eye exam due to high risk medication, encounter for file  Stress:       Feeling of Stress : Not on file  Social Connections:       Frequency of Communication with Friends and Family: Not on file      Frequency of Social Gatherings with Friends and Family: Not on file      Attends Buddhist Services: Not on with concerns above. Patient has been in their usual state of health. History, medications, allergies reviewed as noted. ROS:  Denies any other systemic complaints. No new or changeing lesions other than noted above.  No fevers, chills, night swea Chest arms legs temples  Actinic Keratoses. Precancerous nature discussed. Sun protection, sunscreen/ blocks encouraged Lesions treated with cryo- . Biopsy if not resolved.     x8  Discussed use of fluorouracil strongly recommended using this especially keratoses. No new suspicious lesions. Extensive lentigines, sun damage. Continue regular follow-up stressed again need for sun protection    Patient high risk for further skin cancers    Please refer to map for specific lesions.   See additional diagno

## 2021-11-16 ENCOUNTER — OFFICE VISIT (OUTPATIENT)
Dept: AUDIOLOGY | Facility: CLINIC | Age: 71
End: 2021-11-16
Payer: MEDICARE

## 2021-11-16 ENCOUNTER — OFFICE VISIT (OUTPATIENT)
Dept: OTOLARYNGOLOGY | Facility: CLINIC | Age: 71
End: 2021-11-16
Payer: MEDICARE

## 2021-11-16 VITALS — HEIGHT: 64 IN | WEIGHT: 108 LBS | BODY MASS INDEX: 18.44 KG/M2

## 2021-11-16 DIAGNOSIS — H61.23 BILATERAL IMPACTED CERUMEN: Primary | ICD-10-CM

## 2021-11-16 DIAGNOSIS — H61.23 IMPACTED CERUMEN, BILATERAL: Primary | ICD-10-CM

## 2021-11-16 PROCEDURE — 92557 COMPREHENSIVE HEARING TEST: CPT | Performed by: AUDIOLOGIST

## 2021-11-16 PROCEDURE — G0268 REMOVAL OF IMPACTED WAX MD: HCPCS | Performed by: OTOLARYNGOLOGY

## 2021-11-16 PROCEDURE — 92567 TYMPANOMETRY: CPT | Performed by: AUDIOLOGIST

## 2021-11-16 NOTE — PROGRESS NOTES
Sheridan Donaldson is a 80year old female.   Patient presents with:  Ear Wax  Hearing Loss      HISTORY OF PRESENT ILLNESS    Patient presents for cerumen removal. No other complaints or concerns at this time    Social History    Socioeconomic History Hema/Lymph Negative Easy bleeding and easy bruising.            PHYSICAL EXAM    Ht 5' 4\" (1.626 m)   Wt 108 lb (49 kg)   BMI 18.54 kg/m²        Constitutional Normal Overall appearance - Normal.        Neck Exam Normal Inspection - Normal. Palpation - N EVERY MORNING, Disp: 50 g, Rfl: 2  •  loratadine 10 MG Oral Tab, Take 10 mg by mouth daily. , Disp: , Rfl:   •  multiple vitamin Oral Chew Tab, Chew 1 tablet by mouth daily. , Disp: , Rfl:   •  Montelukast Sodium (SINGULAIR) 10 MG Oral Tab, , Disp: , Rfl: 0 GAVILYTE-G 236 g Oral Recon Soln, TAKE 4000 ML PO AS ONE DOSE (Patient not taking: Reported on 11/16/2021), Disp: , Rfl: 0  •  prednisoLONE acetate 1 % Ophthalmic Suspension, , Disp: , Rfl:   •  Ketotifen Fumarate 0.025 % Ophthalmic Solution, 1 drop.  (Angie

## 2021-12-31 RX ORDER — MINOCYCLINE HYDROCHLORIDE 50 MG/1
CAPSULE ORAL
Qty: 60 CAPSULE | Refills: 2 | Status: SHIPPED | OUTPATIENT
Start: 2021-12-31

## 2021-12-31 NOTE — TELEPHONE ENCOUNTER
LOV 9/2021 - Last office visit notes state pt to consider tapering from the Minocycline. LMTCB for an update from pt. Please advise on refill. Thank you.

## 2021-12-31 NOTE — TELEPHONE ENCOUNTER
Spoke with pt and she states she did request the refill. Pt would like to continue on the medication until her appt in March 2022 and would like to discuss the possibility of alternative treatments at that time.   Pt states she does feel like it is still w

## 2022-01-03 NOTE — TELEPHONE ENCOUNTER
Pt informed of below KMT's recommendations, voiced understanding. She needs a refill on finacea as well. Pended.

## 2022-01-04 RX ORDER — AZELAIC ACID 0.15 G/G
GEL TOPICAL
Qty: 50 G | Refills: 2 | Status: SHIPPED | OUTPATIENT
Start: 2022-01-04

## 2022-01-11 ENCOUNTER — TELEPHONE (OUTPATIENT)
Dept: DERMATOLOGY CLINIC | Facility: CLINIC | Age: 72
End: 2022-01-11

## 2022-01-11 NOTE — TELEPHONE ENCOUNTER
Patient called    Was prescribed Azelaic Acid (FINACEA) 15 % External Gel. Is this for Rosacea?  Please call

## 2022-01-25 ENCOUNTER — TELEPHONE (OUTPATIENT)
Dept: DERMATOLOGY CLINIC | Facility: CLINIC | Age: 72
End: 2022-01-25

## 2022-01-25 NOTE — TELEPHONE ENCOUNTER
Patient called    Asking to give update on how medication is not working. Rash is spreading near eyebrow.  Please call

## 2022-01-25 NOTE — TELEPHONE ENCOUNTER
Pt states the irritation on her face is getting worse and she is getting uncomfortable. Pt currently using tretinoin at night and Finacea in the morning. Pt asking for sooner appt, as soon as possible.   Pt scheduled with KMT on 1/26/22 to reassess humberto

## 2022-01-26 ENCOUNTER — OFFICE VISIT (OUTPATIENT)
Dept: DERMATOLOGY CLINIC | Facility: CLINIC | Age: 72
End: 2022-01-26
Payer: MEDICARE

## 2022-01-26 DIAGNOSIS — Z85.828 HISTORY OF NONMELANOMA SKIN CANCER: ICD-10-CM

## 2022-01-26 DIAGNOSIS — L57.0 AK (ACTINIC KERATOSIS): Primary | ICD-10-CM

## 2022-01-26 DIAGNOSIS — Z51.81 MEDICATION MONITORING ENCOUNTER: ICD-10-CM

## 2022-01-26 PROCEDURE — 99213 OFFICE O/P EST LOW 20 MIN: CPT | Performed by: DERMATOLOGY

## 2022-03-18 ENCOUNTER — OFFICE VISIT (OUTPATIENT)
Dept: DERMATOLOGY CLINIC | Facility: CLINIC | Age: 72
End: 2022-03-18
Payer: MEDICARE

## 2022-03-18 DIAGNOSIS — L57.0 AK (ACTINIC KERATOSIS): Primary | ICD-10-CM

## 2022-03-18 DIAGNOSIS — L71.9 ROSACEA: ICD-10-CM

## 2022-03-18 DIAGNOSIS — L81.4 LENTIGO: ICD-10-CM

## 2022-03-18 DIAGNOSIS — L82.1 SEBORRHEIC KERATOSES: ICD-10-CM

## 2022-03-18 DIAGNOSIS — Z51.81 MEDICATION MONITORING ENCOUNTER: ICD-10-CM

## 2022-03-18 DIAGNOSIS — D22.9 MULTIPLE NEVI: ICD-10-CM

## 2022-03-18 DIAGNOSIS — Z85.828 HISTORY OF NONMELANOMA SKIN CANCER: ICD-10-CM

## 2022-03-18 DIAGNOSIS — L57.8 SUN-DAMAGED SKIN: ICD-10-CM

## 2022-03-18 DIAGNOSIS — D23.9 BENIGN NEOPLASM OF SKIN, UNSPECIFIED LOCATION: ICD-10-CM

## 2022-03-18 PROCEDURE — 17004 DESTROY PREMAL LESIONS 15/>: CPT | Performed by: DERMATOLOGY

## 2022-03-18 PROCEDURE — 99213 OFFICE O/P EST LOW 20 MIN: CPT | Performed by: DERMATOLOGY

## 2022-03-18 RX ORDER — FLUOROURACIL 50 MG/G
CREAM TOPICAL
COMMUNITY
Start: 2022-03-17

## 2022-03-18 RX ORDER — BROMFENAC SODIUM 0.7 MG/ML
SOLUTION/ DROPS OPHTHALMIC
COMMUNITY
Start: 2022-03-17

## 2022-03-18 RX ORDER — BUDESONIDE 3 MG/1
CAPSULE, COATED PELLETS ORAL
COMMUNITY
Start: 2022-02-07

## 2022-03-18 RX ORDER — OFLOXACIN 3 MG/ML
SOLUTION/ DROPS OPHTHALMIC
COMMUNITY
Start: 2022-03-17

## 2022-03-18 RX ORDER — ESCITALOPRAM OXALATE 10 MG/1
10 TABLET ORAL DAILY
COMMUNITY
Start: 2022-01-14

## 2022-03-18 RX ORDER — ROSUVASTATIN CALCIUM 10 MG/1
10 TABLET, COATED ORAL DAILY
COMMUNITY
Start: 2022-03-10

## 2022-07-08 ENCOUNTER — TELEPHONE (OUTPATIENT)
Dept: DERMATOLOGY CLINIC | Facility: CLINIC | Age: 72
End: 2022-07-08

## 2022-07-12 RX ORDER — MINOCYCLINE HYDROCHLORIDE 50 MG/1
CAPSULE ORAL
Qty: 60 CAPSULE | Refills: 2 | Status: SHIPPED | OUTPATIENT
Start: 2022-07-12

## 2022-07-18 ENCOUNTER — OFFICE VISIT (OUTPATIENT)
Dept: DERMATOLOGY CLINIC | Facility: CLINIC | Age: 72
End: 2022-07-18
Payer: MEDICARE

## 2022-07-18 DIAGNOSIS — Z85.828 HISTORY OF NONMELANOMA SKIN CANCER: ICD-10-CM

## 2022-07-18 DIAGNOSIS — L57.8 SUN-DAMAGED SKIN: ICD-10-CM

## 2022-07-18 DIAGNOSIS — Z51.81 MEDICATION MONITORING ENCOUNTER: ICD-10-CM

## 2022-07-18 DIAGNOSIS — D22.9 MULTIPLE NEVI: ICD-10-CM

## 2022-07-18 DIAGNOSIS — L81.4 LENTIGO: ICD-10-CM

## 2022-07-18 DIAGNOSIS — D23.9 BENIGN NEOPLASM OF SKIN, UNSPECIFIED LOCATION: ICD-10-CM

## 2022-07-18 DIAGNOSIS — L57.0 AK (ACTINIC KERATOSIS): Primary | ICD-10-CM

## 2022-07-18 DIAGNOSIS — L82.1 SEBORRHEIC KERATOSES: ICD-10-CM

## 2022-07-18 DIAGNOSIS — L71.9 ROSACEA: ICD-10-CM

## 2022-07-18 PROCEDURE — 99213 OFFICE O/P EST LOW 20 MIN: CPT | Performed by: DERMATOLOGY

## 2022-07-18 PROCEDURE — 17003 DESTRUCT PREMALG LES 2-14: CPT | Performed by: DERMATOLOGY

## 2022-07-18 PROCEDURE — 17000 DESTRUCT PREMALG LESION: CPT | Performed by: DERMATOLOGY

## 2022-08-03 ENCOUNTER — OFFICE VISIT (OUTPATIENT)
Dept: OTOLARYNGOLOGY | Facility: CLINIC | Age: 72
End: 2022-08-03
Payer: MEDICARE

## 2022-08-03 VITALS — HEIGHT: 64 IN | BODY MASS INDEX: 18.44 KG/M2 | TEMPERATURE: 97 F | WEIGHT: 108 LBS

## 2022-08-03 DIAGNOSIS — H61.23 BILATERAL IMPACTED CERUMEN: Primary | ICD-10-CM

## 2022-08-03 PROCEDURE — 69210 REMOVE IMPACTED EAR WAX UNI: CPT | Performed by: STUDENT IN AN ORGANIZED HEALTH CARE EDUCATION/TRAINING PROGRAM

## 2022-11-16 ENCOUNTER — OFFICE VISIT (OUTPATIENT)
Dept: DERMATOLOGY CLINIC | Facility: CLINIC | Age: 72
End: 2022-11-16
Payer: MEDICARE

## 2022-11-16 DIAGNOSIS — Z51.81 MEDICATION MONITORING ENCOUNTER: ICD-10-CM

## 2022-11-16 DIAGNOSIS — Z85.828 HISTORY OF NONMELANOMA SKIN CANCER: ICD-10-CM

## 2022-11-16 DIAGNOSIS — L57.0 AK (ACTINIC KERATOSIS): Primary | ICD-10-CM

## 2022-11-16 DIAGNOSIS — L82.1 SEBORRHEIC KERATOSES: ICD-10-CM

## 2022-11-16 DIAGNOSIS — D23.9 BENIGN NEOPLASM OF SKIN, UNSPECIFIED LOCATION: ICD-10-CM

## 2022-11-16 DIAGNOSIS — L81.4 LENTIGO: ICD-10-CM

## 2022-11-16 DIAGNOSIS — D22.9 MULTIPLE NEVI: ICD-10-CM

## 2022-11-16 PROCEDURE — 17000 DESTRUCT PREMALG LESION: CPT | Performed by: DERMATOLOGY

## 2022-11-16 PROCEDURE — 99213 OFFICE O/P EST LOW 20 MIN: CPT | Performed by: DERMATOLOGY

## 2022-11-16 PROCEDURE — 17003 DESTRUCT PREMALG LES 2-14: CPT | Performed by: DERMATOLOGY

## 2022-11-16 RX ORDER — AZELAIC ACID 0.15 G/G
GEL TOPICAL
Qty: 50 G | Refills: 2 | Status: SHIPPED | OUTPATIENT
Start: 2022-11-16

## 2022-11-16 RX ORDER — FLUOROURACIL 50 MG/G
CREAM TOPICAL
Qty: 40 G | Refills: 2 | Status: SHIPPED | OUTPATIENT
Start: 2022-11-16

## 2023-01-30 NOTE — TELEPHONE ENCOUNTER
Refill Request for medication(s):     Last Office Visit: 11/16/22    Last Refill: 9/15/21    Pharmacy, Dosage verified: yes    Condition Update (if applicable):     Rx pended and sent to provider for approval, please advise. Thank You!

## 2023-01-31 RX ORDER — TRETINOIN 0.5 MG/G
CREAM TOPICAL
Qty: 45 G | Refills: 5 | Status: SHIPPED | OUTPATIENT
Start: 2023-01-31

## 2023-07-31 ENCOUNTER — OFFICE VISIT (OUTPATIENT)
Dept: DERMATOLOGY CLINIC | Facility: CLINIC | Age: 73
End: 2023-07-31

## 2023-07-31 DIAGNOSIS — L71.9 ROSACEA: ICD-10-CM

## 2023-07-31 DIAGNOSIS — Z51.81 MEDICATION MONITORING ENCOUNTER: ICD-10-CM

## 2023-07-31 DIAGNOSIS — L81.4 LENTIGO: ICD-10-CM

## 2023-07-31 DIAGNOSIS — D22.9 MULTIPLE NEVI: ICD-10-CM

## 2023-07-31 DIAGNOSIS — L82.1 SEBORRHEIC KERATOSES: ICD-10-CM

## 2023-07-31 DIAGNOSIS — L57.8 SUN-DAMAGED SKIN: ICD-10-CM

## 2023-07-31 DIAGNOSIS — Z85.828 HISTORY OF NONMELANOMA SKIN CANCER: ICD-10-CM

## 2023-07-31 DIAGNOSIS — L57.0 AK (ACTINIC KERATOSIS): Primary | ICD-10-CM

## 2023-07-31 DIAGNOSIS — D23.9 BENIGN NEOPLASM OF SKIN, UNSPECIFIED LOCATION: ICD-10-CM

## 2023-07-31 PROCEDURE — 17000 DESTRUCT PREMALG LESION: CPT | Performed by: DERMATOLOGY

## 2023-07-31 PROCEDURE — 99213 OFFICE O/P EST LOW 20 MIN: CPT | Performed by: DERMATOLOGY

## 2023-07-31 PROCEDURE — 17003 DESTRUCT PREMALG LES 2-14: CPT | Performed by: DERMATOLOGY

## 2023-08-07 NOTE — PROGRESS NOTES
Hilda Davies is a 68year old female. HPI:     CC:  Patient presents with:  Derm Problem: Lov . Pt present for a follow up visit. Pt has hx of SCC and AK's. Pt present with the following areas of concern, R cheek, 2 spots on back L, 2 spots on L thigh, R shin, R forearm, L armpit, 2-3 spots on chest, and one spot on L side of scalp. Pt noticed these spots occurring in the past 6 months. Pt states some spots can be itchy. Allergies:  Adhesive Tape and Dust Mite Extract    HISTORY:    Past Medical History:   Diagnosis Date    Age-related nuclear cataract of both eyes 2014    Allergic contact dermatitis of eyelids of both eyes 2015    Breast cancer ([facility] [address]     per [employer]:  lumpectomy    Eye exam due to high risk medication, encounter for     Long-term use of Plaquenil     Other and unspecified hyperlipidemia     Rosacea     SCC (squamous cell carcinoma), arm, left 2020    left anterio upper arm      Past Surgical History:   Procedure Laterality Date    COLONOSCOPY        Family History   Problem Relation Age of Onset    Diabetes Mother     Glaucoma Neg     Macular degeneration Neg       Social History     Socioeconomic History    Marital status: Unknown   Tobacco Use    Smoking status: Former     Types: Cigarettes     Quit date: 1990     Years since quittin.6    Smokeless tobacco: Never   Vaping Use    Vaping Use: Never used   Substance and Sexual Activity    Alcohol use:  Yes     Alcohol/week: 0.0 standard drinks of alcohol     Comment: 1-2 drinks a week     Drug use: No   Other Topics Concern    Pt has a pacemaker No    Pt has a defibrillator No    Reaction to local anesthetic No        Current Outpatient Medications   Medication Sig Dispense Refill    Tretinoin 0.05 % External Cream APPLY TO ENTIRE FACE AND CHEST FOR MANAGEMENT OF PRE-CANCER AS DIRECTED EVERY NIGHT AT BEDTIME 45 g 5    minocycline 50 MG Oral Cap TAKE 1 CAPSULE BY MOUTH DAILY 60 capsule 5    Azelaic Acid (FINACEA) 15 % External Gel APPLY TO AFFECTED AREA(S) EVERY MORNING 50 g 2    fluorouracil 5 % External Cream Use 2 times weekly for 6 weeks to forehead at hairline, and chest 40 g 2    PROLENSA 0.07 % Ophthalmic Solution       budesonide 3 MG Oral Cap DR Particles       escitalopram 10 MG Oral Tab Take 1 tablet (10 mg total) by mouth daily. ofloxacin 0.3 % Ophthalmic Solution       rosuvastatin 10 MG Oral Tab Take 1 tablet (10 mg total) by mouth daily. hydrocortisone 2.5 % External Cream Apply 1 Application topically 2 (two) times daily. Apply every morning and evening. 30 g 3    tretinoin 0.025 % External Cream APPLY TO AFFECTED AREA(S) EVERY DAY 20 g 3    Minocycline HCl Does not apply Powder Take by mouth. alendronate 70 MG Oral Tab Take 1 tablet (70 mg total) by mouth. alendronate 70 MG Oral Tab TK 1 T PO WEEKLY  2    erythromycin 5 MG/GM Ophthalmic Ointment   3    hydrocortisone 2.5 % External Ointment Apply thin layer to affected area twice daily as needed for up to 2 weeks. hydrocortisone 2.5 % External Ointment CARA THIN LAYER EXT AA BID FOR UP TO 2 WKS PRN  1    Neomycin-Polymyxin-Dexameth 3.5-46036-6.1 Ophthalmic Ointment   0    GAVILYTE-G 236 g Oral Recon Soln TAKE 4000 ML PO AS ONE DOSE  0    prednisoLONE acetate 1 % Ophthalmic Suspension       brimonidine Tartrate 0.2 % Ophthalmic Solution INT 1 GTT INTO OU BID  3    Ketotifen Fumarate 0.025 % Ophthalmic Solution 1 drop.      latanoprost 0.005 % Ophthalmic Solution INT 1 GTT INTO OU NIGHTLY  3    Timolol Maleate 0.5 % Ophthalmic Solution   3    loratadine 10 MG Oral Tab Take 1 tablet (10 mg total) by mouth daily. multiple vitamin Oral Chew Tab Chew 1 tablet by mouth daily. Montelukast Sodium (SINGULAIR) 10 MG Oral Tab   0    alprazolam (XANAX) 1 MG Oral Tab   4    Desonide (DESOWEN) 0.05 % Apply Externally Cream Apply topically. triamcinolone acetonide (KENALOG) 0.1 % Apply Externally Cream Apply topically. anastrozole 1 MG Oral Tab tab Take by mouth. Fluticasone Propionate (FLONASE) 50 MCG/ACT Nasal Suspension   11    Ipratropium Bromide (ATROVENT) 0.06 % Nasal Solution   1    simvastatin (ZOCOR) 10 MG Oral Tab   11    folic acid (FOLVITE) 1 MG Oral Tab   0    gabapentin (NEURONTIN) 300 MG Oral Cap   2    HYDROcodone-acetaminophen (NORCO) 5-325 MG Oral Tab   0    Lisinopril-Hydrochlorothiazide 10-12.5 MG Oral Tab   11    methotrexate (RHEUMATREX) 2.5 MG Oral Tab   1    naproxen (NAPROSYN) 500 MG Oral Tab   2    Sertraline HCl (ZOLOFT) 100 MG Oral Tab   11    Zolpidem Tartrate (AMBIEN) 10 MG Oral Tab   4     Allergies:     Adhesive Tape               Comment:CLOTH TAPE*  Dust Mite Extract           Comment:Molds, Grass, Trees    Past Medical History:   Diagnosis Date    Age-related nuclear cataract of both eyes 2014    Allergic contact dermatitis of eyelids of both eyes 2015    Breast cancer ([facility] [address]     per [employer]:  lumpectomy    Eye exam due to high risk medication, encounter for     Long-term use of Plaquenil     Other and unspecified hyperlipidemia     Rosacea     SCC (squamous cell carcinoma), arm, left 2020    left anterio upper arm     Past Surgical History:   Procedure Laterality Date    COLONOSCOPY       Social History    Socioeconomic History      Marital status: Unknown      Spouse name: Not on file      Number of children: Not on file      Years of education: Not on file      Highest education level: Not on file    Occupational History      Not on file    Tobacco Use      Smoking status: Former        Types: Cigarettes        Quit date: 1990        Years since quittin.6      Smokeless tobacco: Never    Vaping Use      Vaping Use: Never used    Substance and Sexual Activity      Alcohol use:  Yes        Alcohol/week: 0.0 standard drinks of alcohol        Comment: 1-2 drinks a week       Drug use: No      Sexual activity: Not on file    Other Topics      Concerns:        Grew up on a farm: Not Asked        History of tanning: Not Asked        Outdoor occupation: Not Asked        Pt has a pacemaker: No        Pt has a defibrillator: No        Breast feeding: Not Asked        Reaction to local anesthetic: No    Social History Narrative      Not on file    Social Determinants of Health  Financial Resource Strain: Not on file  Food Insecurity: Not on file  Transportation Needs: Not on file  Physical Activity: Not on file  Stress: Not on file  Social Connections: Not on file  Housing Stability: Not on file  Family History   Problem Relation Age of Onset    Diabetes Mother     Glaucoma Neg     Macular degeneration Neg        There were no vitals filed for this visit. HPI:    Patient presents with:  Derm Problem: Lov 11/22. Pt present for a follow up visit. Pt has hx of SCC and AK's. Pt present with the following areas of concern, R cheek, 2 spots on back L, 2 spots on L thigh, R shin, R forearm, L armpit, 2-3 spots on chest, and one spot on L side of scalp. Pt noticed these spots occurring in the past 6 months. Pt states some spots can be itchy. Follow-up history AK's, SCC. Notes several lesions on the face back arms legs scalp    Uses Efudex intermittently    Has still been using her tretinoin and azelaic acid is on minocycline for rosacea  Noted redness and irritation  As noted recently: On minocycline for acne, rosacea. Has used Retin-A 0.025% cream previously as well. Tolerating has been outdoors in the sun does try to use sunscreen on the face    History of actinic keratoses, status post ENC of SCC at left shoulder in November 2020. Had seen Dr. Himanshu Michaels recently who confirmed adequate treatment per current guidelines no further excision as no clinical evidence of persistent lesion. Patient does have Efudex has not started this as recommended. Again has not used the fluorouracil    Patient on long-term Plaquenil, methotrexate. On Arimidex.     Lots of sun exposure in the past. Minocycline has been working okay. Had GI upset with doxycycline. Uses topicals    Patient presents with concerns above. Patient has been in their usual state of health. History, medications, allergies reviewed as noted. ROS:  Denies any other systemic complaints. No new or changeing lesions other than noted above. No fevers, chills, night sweats, unusual sun sensitivity. No other skin complaints. History, medications, allergies reviewed as noted. Physical Examination:     Well-developed well-nourished patient alert oriented in no acute distress. Exam total-body performed, including scalp, head, neck, face,nails, hair, external eyes, including conjunctival mucosa, eyelids, lips external ears, back, chest,/ breasts, axillae,  abdomen, arms, legs, palms. Multiple light to medium brown, well marginated, uniformly pigmented, macules and papules 6 mm and less scattered on exam. pigmented lesions examined with dermoscopy benign-appearing patterns. Waxy tannish keratotic papules scattered, cherry-red vascular papules scattered. See map today's date for lesions noted . Extensive lentigines, actinic damage. Otherwise remarkable for lesions as noted on map. See Assessment /Plan for additional history and physical exam also:    Assessment / plan:    No orders of the defined types were placed in this encounter. Meds & Refills for this Visit:  Requested Prescriptions      No prescriptions requested or ordered in this encounter         Ak (actinic keratosis)  (primary encounter diagnosis)  Seborrheic keratoses  Lentigo  Benign neoplasm of skin, unspecified location  Multiple nevi  History of nonmelanoma skin cancer  Rosacea  Sun-damaged skin  Medication monitoring encounter      Erythematous scaling keratotic papules noted at sites noted on map  Actinic Keratoses. Precancerous nature discussed. Sun protection, sunscreen/ blocks encouraged Lesions treated with cryo- .   Biopsy if not resolved. Anterior forehead left templex2  Repeat Efudex to forehead temple area along the anterior hairline    Repeat Efudex to these areas and to arms  Nasal dorsum and chest improved  Efudex instructions  Twice weekly for 6 weeks    SCC left anterior upper arm 1.5 cm at time of ENC treated with [facility] 11/2020 no recurrence clinically presently or noted by Dr. Coleman Castillo. Would concur with recommendation for further fluorouracil. Overall doing much better continue careful monitoring follow-up every 6 months  History of SCC, numerous AK's over the arms hands improving  Stable doing well     overall otherwise stable multiple benign keratoses noted  Waxy tan keratotic papules lesions in areas of concern as noted reassurance given. Benign nature discussed. Possibility of cryo, alphahydroxy acids over-the-counter retinol's discussed. Suggest this on the chest as well given background of sun damage    In addition start tretinoin for chemoprevention in between courses of fluorouracil especially over the chest nose temples  Prominent solar elastosis noted  Continue careful sun protection patient using sunscreen inconsistently      Left pretibial leg lesion evaluated previously treated with cryo-verrucoid keratosis appears resolved monitor    More diffuse sun damage consider additional topicals. Continue sunscreen, tretinoin    Acneiform papules over the cheeks chin jawline face. Retin-A. Overall improved continue over-the-counter retinol's for lentigines over the cheeks discussed tretinoin, versus over-the-counter products   and chemoprevention as well as rosacea continue careful sun protection moisturizers. Patient notes tretinoin very helpful for acne still breaks out mask seems to make this worse. Tolerating tretinoin without any problems    Rosacea, large inflammatory acne lesions doing well on minocycline continue current medication. Consider tapering few larger inflammatory papular nodules at times. Continue topicals also refill if needed see medications in grid. Skin care regimen discussed at length including cleansers, makeup, face washing, sunscreen. Recheck if continued flaring. Notify us promptly if problems tolerating regimen. Consider more aggressive therapy if not responding. Discussed possible decrease in minocycline patient apprehensive we will see how she does over the fall consider decreasing to every other day    Lesions of concern waxy tan keratotic papules consistent with benign seborrheic keratoses over the back thigh legs axilla scalp    Multiple benign keratoses lentigines extensive lesions. Sun damage moderate to severe especially chest arms. Careful sun protection. Discussed over-the-counter retinol's. Alphahydroxy acids. Sun protection. Continue careful follow-up  Multiple benign keratoses. No new suspicious lesions. Extensive lentigines, sun damage. Continue regular follow-up stressed again need for sun protection    Patient high risk for further skin cancers    Please refer to map for specific lesions. See additional diagnoses. Pros cons of various therapies, risks benefits discussed. Pathophysiology discussed with patient. Therapeutic options reviewed. See  Medications in grid. Instructions reviewed at length. Benign nevi, seborrheic  keratoses, cherry angiomas:  Reassurance regarding other benign skin lesions. Signs and symptoms of skin cancer, ABCDE's of melanoma discussed with patient. Sunscreen use, sun protection, self exams reviewed. Followup as noted RTC   Recheck 2-3 months or as needed    Encounter Times  Including precharting, reviewing chart, prior notes obtaining history: 5 minutes, medical exam :10 minutes, notes on body map, plan, counseling 10minutes My total time spent caring for the patient on the day of the encounter: 25 minutes       The patient indicates understanding of these issues and agrees to the plan.   The patient is asked to return as noted in follow-up/ above. This note was generated using Dragon voice recognition software. Please contact me regarding any confusion resulting from errors in recognition.

## 2023-10-12 ENCOUNTER — ORDER TRANSCRIPTION (OUTPATIENT)
Dept: ADMINISTRATIVE | Facility: HOSPITAL | Age: 73
End: 2023-10-12

## 2023-10-12 DIAGNOSIS — Z13.6 SCREENING FOR CARDIOVASCULAR CONDITION: Primary | ICD-10-CM

## 2023-11-01 ENCOUNTER — OFFICE VISIT (OUTPATIENT)
Dept: DERMATOLOGY CLINIC | Facility: CLINIC | Age: 73
End: 2023-11-01

## 2023-11-01 DIAGNOSIS — Z85.828 HISTORY OF NONMELANOMA SKIN CANCER: ICD-10-CM

## 2023-11-01 DIAGNOSIS — D23.9 BENIGN NEOPLASM OF SKIN, UNSPECIFIED LOCATION: ICD-10-CM

## 2023-11-01 DIAGNOSIS — L71.9 ROSACEA: ICD-10-CM

## 2023-11-01 DIAGNOSIS — Z51.81 MEDICATION MONITORING ENCOUNTER: ICD-10-CM

## 2023-11-01 DIAGNOSIS — L81.4 LENTIGO: ICD-10-CM

## 2023-11-01 DIAGNOSIS — L57.0 AK (ACTINIC KERATOSIS): Primary | ICD-10-CM

## 2023-11-01 DIAGNOSIS — L57.8 SUN-DAMAGED SKIN: ICD-10-CM

## 2023-11-01 DIAGNOSIS — D22.9 MULTIPLE NEVI: ICD-10-CM

## 2023-11-01 DIAGNOSIS — L82.1 SEBORRHEIC KERATOSES: ICD-10-CM

## 2023-11-01 PROCEDURE — 99213 OFFICE O/P EST LOW 20 MIN: CPT | Performed by: DERMATOLOGY

## 2023-11-01 PROCEDURE — 17000 DESTRUCT PREMALG LESION: CPT | Performed by: DERMATOLOGY

## 2023-11-01 PROCEDURE — 17003 DESTRUCT PREMALG LES 2-14: CPT | Performed by: DERMATOLOGY

## 2023-11-12 NOTE — PROGRESS NOTES
Lucila Vegas is a 68year old female. HPI:     CC:    Chief Complaint   Patient presents with    Full Skin Exam     Patient present for a Full Body Check - LOV 23 - Patient has a hx of SCC and AK's          Allergies:  Adhesive tape and Dust mite extract    HISTORY:    Past Medical History:   Diagnosis Date    Age-related nuclear cataract of both eyes 2014    Allergic contact dermatitis of eyelids of both eyes 2015    Breast cancer ([address]     per [facility]:  lumpectomy    Eye exam due to high risk medication, encounter for     Long-term use of Plaquenil     Other and unspecified hyperlipidemia     Rosacea     SCC (squamous cell carcinoma), arm, left 2020    left anterio upper arm      Past Surgical History:   Procedure Laterality Date    COLONOSCOPY        Family History   Problem Relation Age of Onset    Diabetes Mother     Glaucoma Neg     Macular degeneration Neg       Social History     Socioeconomic History    Marital status: Unknown   Tobacco Use    Smoking status: Former     Types: Cigarettes     Quit date: 1990     Years since quittin.8     Passive exposure: Past    Smokeless tobacco: Never   Vaping Use    Vaping Use: Never used   Substance and Sexual Activity    Alcohol use:  Yes     Alcohol/week: 0.0 standard drinks of alcohol     Comment: 1-2 drinks a week     Drug use: No   Other Topics Concern    Pt has a pacemaker No    Pt has a defibrillator No    Breast feeding No    Reaction to local anesthetic No        Current Outpatient Medications   Medication Sig Dispense Refill    Tretinoin 0.05 % External Cream APPLY TO ENTIRE FACE AND CHEST FOR MANAGEMENT OF PRE-CANCER AS DIRECTED EVERY NIGHT AT BEDTIME 45 g 5    minocycline 50 MG Oral Cap TAKE 1 CAPSULE BY MOUTH DAILY 60 capsule 5    Azelaic Acid (FINACEA) 15 % External Gel APPLY TO AFFECTED AREA(S) EVERY MORNING 50 g 2    fluorouracil 5 % External Cream Use 2 times weekly for 6 weeks to forehead at hairline, and chest 40 g 2    PROLENSA 0.07 % Ophthalmic Solution       budesonide 3 MG Oral Cap DR Particles       escitalopram 10 MG Oral Tab Take 1 tablet (10 mg total) by mouth daily. ofloxacin 0.3 % Ophthalmic Solution       rosuvastatin 10 MG Oral Tab Take 1 tablet (10 mg total) by mouth daily. hydrocortisone 2.5 % External Cream Apply 1 Application topically 2 (two) times daily. Apply every morning and evening. 30 g 3    tretinoin 0.025 % External Cream APPLY TO AFFECTED AREA(S) EVERY DAY 20 g 3    Minocycline HCl Does not apply Powder Take by mouth. alendronate 70 MG Oral Tab Take 1 tablet (70 mg total) by mouth. alendronate 70 MG Oral Tab TK 1 T PO WEEKLY  2    erythromycin 5 MG/GM Ophthalmic Ointment   3    hydrocortisone 2.5 % External Ointment Apply thin layer to affected area twice daily as needed for up to 2 weeks. hydrocortisone 2.5 % External Ointment CARA THIN LAYER EXT AA BID FOR UP TO 2 WKS PRN  1    Neomycin-Polymyxin-Dexameth 3.5-20685-0.1 Ophthalmic Ointment   0    GAVILYTE-G 236 g Oral Recon Soln TAKE 4000 ML PO AS ONE DOSE  0    prednisoLONE acetate 1 % Ophthalmic Suspension       brimonidine Tartrate 0.2 % Ophthalmic Solution INT 1 GTT INTO OU BID  3    Ketotifen Fumarate 0.025 % Ophthalmic Solution 1 drop.      latanoprost 0.005 % Ophthalmic Solution INT 1 GTT INTO OU NIGHTLY  3    Timolol Maleate 0.5 % Ophthalmic Solution   3    loratadine 10 MG Oral Tab Take 1 tablet (10 mg total) by mouth daily. multiple vitamin Oral Chew Tab Chew 1 tablet by mouth daily. Montelukast Sodium (SINGULAIR) 10 MG Oral Tab   0    alprazolam (XANAX) 1 MG Oral Tab   4    Desonide (DESOWEN) 0.05 % Apply Externally Cream Apply topically. triamcinolone acetonide (KENALOG) 0.1 % Apply Externally Cream Apply topically. anastrozole 1 MG Oral Tab tab Take by mouth.         Fluticasone Propionate (FLONASE) 50 MCG/ACT Nasal Suspension   11    Ipratropium Bromide (ATROVENT) 0.06 % Nasal Solution   1 simvastatin (ZOCOR) 10 MG Oral Tab   11    folic acid (FOLVITE) 1 MG Oral Tab   0    gabapentin (NEURONTIN) 300 MG Oral Cap   2    HYDROcodone-acetaminophen (NORCO) 5-325 MG Oral Tab   0    Lisinopril-Hydrochlorothiazide 10-12.5 MG Oral Tab   11    methotrexate (RHEUMATREX) 2.5 MG Oral Tab   1    naproxen (NAPROSYN) 500 MG Oral Tab   2    Sertraline HCl (ZOLOFT) 100 MG Oral Tab   11    Zolpidem Tartrate (AMBIEN) 10 MG Oral Tab   4     Allergies: Allergies   Allergen Reactions    Adhesive Tape      CLOTH TAPE*    Dust Mite Extract      Molds, Grass, Trees       Past Medical History:   Diagnosis Date    Age-related nuclear cataract of both eyes 2014    Allergic contact dermatitis of eyelids of both eyes 2015    Bre[facility] ([facility]     per [facility]:  lumpectomy    Eye exam due to high risk medication, encounter for     Long-term use of Plaquenil     Other and unspecified hyperlipidemia     Rosacea     SCC (squamous cell carcinoma), arm, left 2020    left anterio upper arm     Past Surgical History:   Procedure Laterality Date    COLONOSCOPY       Social History     Socioeconomic History    Marital status: Unknown     Spouse name: Not on file    Number of children: Not on file    Years of education: Not on file    Highest education level: Not on file   Occupational History    Not on file   Tobacco Use    Smoking status: Former     Types: Cigarettes     Quit date: 1990     Years since quittin.8     Passive exposure: Past    Smokeless tobacco: Never   Vaping Use    Vaping Use: Never used   Substance and Sexual Activity    Alcohol use:  Yes     Alcohol/week: 0.0 standard drinks of alcohol     Comment: 1-2 drinks a week     Drug use: No    Sexual activity: Not on file   Other Topics Concern    Grew up on a farm Not Asked    History of tanning Not Asked    Outdoor occupation Not Asked    Pt has a pacemaker No    Pt has a defibrillator No    Breast feeding No    Reaction to local anesthetic No Social History Narrative    Not on file     Social Determinants of Health     Financial Resource Strain: Not on file   Food Insecurity: Not on file   Transportation Needs: Not on file   Physical Activity: Not on file   Stress: Not on file   Social Connections: Not on file   Housing Stability: Not on file     Family History   Problem Relation Age of Onset    Diabetes Mother     Glaucoma Neg     Macular degeneration Neg        There were no vitals filed for this visit. HPI:    Chief Complaint   Patient presents with    Full Skin Exam     Patient present for a Full Body Check - LOV 07-31-23 - Patient has a hx of SCC and AK's        Follow-up history AK's, SCC. Notes several lesions on the face back arms legs scalp    Uses Efudex intermittently      Has still been using her tretinoin and azelaic acid is on minocycline for rosacea  Noted redness and irritation  As noted recently: On minocycline for acne, rosacea. Has used Retin-A 0.025% cream previously as well. Tolerating has been outdoors in the sun does try to use sunscreen on the face    History of actinic keratoses, status post ENC of SCC at left shoulder in November 2020. Had seen Dr. Juan F Mallory recently who confirmed adequate treatment per current guidelines no further excision as no clinical evidence of persistent lesion. Patient does have Efudex has not started this as recommended. Again has not used the fluorouracil    Patient on long-term Plaquenil, methotrexate. On Arimidex. Lots of sun exposure in the past.  Minocycline has been working okay. Had GI upset with doxycycline. Uses topicals    Patient presents with concerns above. Patient has been in their usual state of health. History, medications, allergies reviewed as noted. ROS:  Denies any other systemic complaints. No new or changeing lesions other than noted above. No fevers, chills, night sweats, unusual sun sensitivity. No other skin complaints.         History, medications, allergies reviewed as noted. Physical Examination:     Well-developed well-nourished patient alert oriented in no acute distress. Exam total-body performed, including scalp, head, neck, face,nails, hair, external eyes, including conjunctival mucosa, eyelids, lips external ears, back, chest,/ breasts, axillae,  abdomen, arms, legs, palms. Multiple light to medium brown, well marginated, uniformly pigmented, macules and papules 6 mm and less scattered on exam. pigmented lesions examined with dermoscopy benign-appearing patterns. Waxy tannish keratotic papules scattered, cherry-red vascular papules scattered. See map today's date for lesions noted . Extensive lentigines, actinic damage. Otherwise remarkable for lesions as noted on map. See Assessment /Plan for additional history and physical exam also:    Assessment / plan:    No orders of the defined types were placed in this encounter. Meds & Refills for this Visit:  Requested Prescriptions      No prescriptions requested or ordered in this encounter         Encounter Diagnoses   Name Primary? AK (actinic keratosis) Yes    Seborrheic keratoses     Lentigo     Benign neoplasm of skin, unspecified location     Multiple nevi     History of nonmelanoma skin cancer     Rosacea     Sun-damaged skin     Medication monitoring encounter          Erythematous scaling keratotic papules noted at sites noted on map  Actinic Keratoses. Precancerous nature discussed. Sun protection, sunscreen/ blocks encouraged Lesions treated with cryo- . Biopsy if not resolved. Forehead chest arms [id number] total    Repeat Efudex to these areas above nasal dorsum and chest improved  Efudex instructions  Twice weekly for 6 weeks    Nothing else new and suspicious  SCC left anterior upper arm 1.5 cm at time of ENC treated with [facility] 11/2020 no recurrence clinically presently or noted by Dr. Kim Bagley.   Would concur with recommendation for further fluorouracil. Overall doing much better continue careful monitoring follow-up every 6 months  History of SCC, numerous AK's over the arms hands improving  Stable doing well     overall otherwise stable multiple benign keratoses noted  Waxy tan keratotic papules lesions in areas of concern as noted reassurance given. Benign nature discussed. Possibility of cryo, alphahydroxy acids over-the-counter retinol's discussed. Suggest this on the chest as well given background of sun damage    In addition start tretinoin for chemoprevention in between courses of fluorouracil especially over the chest nose temples reviewed  Prominent solar elastosis noted  Continue careful sun protection patient using sunscreen inconsistently      Left pretibial leg lesion evaluated previously treated with cryo-verrucoid keratosis appears resolved monitor    More diffuse sun damage consider additional topicals. Continue sunscreen, tretinoin    Acneiform papules over the cheeks chin jawline face. Improved Retin-A. Overall improved continue over-the-counter retinol's for lentigines over the cheeks discussed tretinoin, versus over-the-counter products   and chemoprevention as well as rosacea continue careful sun protection moisturizers. Patient notes tretinoin very helpful for acne still breaks out mask seems to make this worse. Tolerating tretinoin without any problems    Rosacea, large inflammatory acne lesions doing well on minocycline continue current medication. Consider tapering few larger inflammatory papular nodules at times. Continue topicals also refill if needed see medications in grid. Skin care regimen discussed at length including cleansers, makeup, face washing, sunscreen. Recheck if continued flaring. Notify us promptly if problems tolerating regimen. Consider more aggressive therapy if not responding.   Discussed possible decrease in minocycline patient apprehensive we will see how she does over the fall consider decreasing to every other day    Lesions of concern waxy tan keratotic papules consistent with benign seborrheic keratoses over the back thigh legs axilla scalp    Multiple benign keratoses lentigines extensive lesions. Sun damage moderate to severe especially chest arms. Careful sun protection. Discussed over-the-counter retinol's. Alphahydroxy acids. Sun protection. Continue careful follow-up  Multiple benign keratoses. No new suspicious lesions. Extensive lentigines, sun damage. Continue regular follow-up stressed again need for sun protection    Patient high risk for further skin cancers    Please refer to map for specific lesions. See additional diagnoses. Pros cons of various therapies, risks benefits discussed. Pathophysiology discussed with patient. Therapeutic options reviewed. See  Medications in grid. Instructions reviewed at length. Benign nevi, seborrheic  keratoses, cherry angiomas:  Reassurance regarding other benign skin lesions. Signs and symptoms of skin cancer, ABCDE's of melanoma discussed with patient. Sunscreen use, sun protection, self exams reviewed. Followup as noted RTC   Recheck 2-3 months or as needed    Encounter Times  Including precharting, reviewing chart, prior notes obtaining history: 5 minutes, medical exam :10 minutes, notes on body map, plan, counseling 10minutes My total time spent caring for the patient on the day of the encounter: 25 minutes       The patient indicates understanding of these issues and agrees to the plan. The patient is asked to return as noted in follow-up/ above. This note was generated using Dragon voice recognition software. Please contact me regarding any confusion resulting from errors in recognition.

## 2023-12-28 RX ORDER — MINOCYCLINE HYDROCHLORIDE 50 MG/1
CAPSULE ORAL
Qty: 60 CAPSULE | Refills: 5 | Status: SHIPPED | OUTPATIENT
Start: 2023-12-28

## 2023-12-28 NOTE — TELEPHONE ENCOUNTER
Refill Request for medication(s): MINOCYCLINE 50 MG Oral Cap     Last Office Visit: 12/30/22    Last Refill: 11/01/23    Pharmacy, Dosage verified: [id number], [address]  [address], [phone], [phone]     Rx pended and sent to provider for approval, please advise. Thank You!

## 2024-05-16 ENCOUNTER — OFFICE VISIT (OUTPATIENT)
Dept: DERMATOLOGY CLINIC | Facility: CLINIC | Age: 74
End: 2024-05-16

## 2024-05-16 DIAGNOSIS — Z51.81 MEDICATION MONITORING ENCOUNTER: ICD-10-CM

## 2024-05-16 DIAGNOSIS — D23.9 BENIGN NEOPLASM OF SKIN, UNSPECIFIED LOCATION: ICD-10-CM

## 2024-05-16 DIAGNOSIS — Z08 ENCOUNTER FOR FOLLOW-UP SURVEILLANCE OF SKIN CANCER: ICD-10-CM

## 2024-05-16 DIAGNOSIS — L71.9 ROSACEA: ICD-10-CM

## 2024-05-16 DIAGNOSIS — L57.8 SUN-DAMAGED SKIN: ICD-10-CM

## 2024-05-16 DIAGNOSIS — L82.1 SEBORRHEIC KERATOSES: ICD-10-CM

## 2024-05-16 DIAGNOSIS — Z85.828 ENCOUNTER FOR FOLLOW-UP SURVEILLANCE OF SKIN CANCER: ICD-10-CM

## 2024-05-16 DIAGNOSIS — L57.0 AK (ACTINIC KERATOSIS): Primary | ICD-10-CM

## 2024-05-16 DIAGNOSIS — L81.4 LENTIGO: ICD-10-CM

## 2024-05-16 DIAGNOSIS — D22.9 MULTIPLE NEVI: ICD-10-CM

## 2024-05-16 RX ORDER — BUPROPION HYDROCHLORIDE 75 MG/1
75 TABLET ORAL
COMMUNITY
Start: 2024-04-22

## 2024-05-16 RX ORDER — ZOLPIDEM TARTRATE 5 MG/1
5 TABLET ORAL NIGHTLY PRN
COMMUNITY
Start: 2024-05-06

## 2024-05-16 RX ORDER — ESCITALOPRAM OXALATE 20 MG/1
20 TABLET ORAL
COMMUNITY
Start: 2024-02-26

## 2024-05-16 RX ORDER — METRONIDAZOLE 500 MG/1
500 TABLET ORAL 3 TIMES DAILY
COMMUNITY
Start: 2024-04-20

## 2024-05-16 RX ORDER — CLONAZEPAM 0.12 MG/1
0.12 TABLET, ORALLY DISINTEGRATING ORAL DAILY PRN
COMMUNITY
Start: 2024-04-03

## 2024-05-27 RX ORDER — MINOCYCLINE HYDROCHLORIDE 50 MG/1
CAPSULE ORAL
Qty: 60 CAPSULE | Refills: 5 | Status: SHIPPED | OUTPATIENT
Start: 2024-05-27

## 2024-05-28 NOTE — PROGRESS NOTES
Alexandra Leung is a 74 year old female.  HPI:     CC:    Chief Complaint   Patient presents with    Upper Body Exam     Hx of Aks and SCC.  LOV 2023.  Pt presents for upper body exam.  Pt would like her face evaluated.  Denies bleeding or pain to the face.           Allergies:  Adhesive tape and Dust mite extract    HISTORY:    Past Medical History:    Age-related nuclear cataract of both eyes    Allergic contact dermatitis of eyelids of both eyes    Breast cancer (HCC)    per [facility]:  lumpectomy    Eye exam due to high risk medication, encounter for    Long-term use of Plaquenil    Other and unspecified hyperlipidemia    Rosacea    SCC (squamous cell carcinoma), arm, left    left anterio upper arm      Past Surgical History:   Procedure Laterality Date    Colonoscopy        Family History   Problem Relation Age of Onset    Diabetes Mother     Glaucoma Neg     Macular degeneration Neg       Social History     Socioeconomic History    Marital status: Unknown   Tobacco Use    Smoking status: Former     Current packs/day: 0.00     Types: Cigarettes     Quit date: 1990     Years since quittin.4     Passive exposure: Past    Smokeless tobacco: Never   Vaping Use    Vaping status: Never Used   Substance and Sexual Activity    Alcohol use: Yes     Alcohol/week: 0.0 standard drinks of alcohol     Comment: 1-2 drinks a week     Drug use: No   Other Topics Concern    Grew up on a farm No    History of tanning Yes    Outdoor occupation No    Pt has a pacemaker No    Pt has a defibrillator No    Breast feeding No    Reaction to local anesthetic No     Social Determinants of Health     Financial Resource Strain: Patient Declined (2024)    Received from [facility]    Overall Financial Resource Strain (CARDIA)     Difficulty of Paying Living Expenses: Patient declined   Food Insecurity: No Food Insecurity (2024)    Received from [facility]    Hunger Vital Sign      Worried About Running Out of Food in the Last Year: Never true     Ran Out of Food in the Last Year: Never true   Transportation Needs: Patient Declined (5/6/2024)    Received from [facility]    PRAPARE - Transportation     Lack of Transportation (Medical): Patient declined     Lack of Transportation (Non-Medical): Patient declined    [facility]    Social Connections   Housing Stability: Patient Declined (5/6/2024)    Received from [facility]    Housing Stability Vital Sign     Unable to Pay for Housing in the Last Year: Patient declined     In the last 12 months, was there a time when you did not have a steady place to sleep or slept in a shelter (including now)?: Patient declined        Current Outpatient Medications   Medication Sig Dispense Refill    buPROPion 75 MG Oral Tab Take 1 tablet (75 mg total) by mouth daily with breakfast.      clonazePAM 0.125 MG Oral Tablet Dispersible 1 tablet (0.125 mg total) daily as needed.      metRONIDAZOLE 500 MG Oral Tab Take 1 tablet (500 mg total) by mouth 3 (three) times daily.      escitalopram 20 MG Oral Tab Take 1 tablet (20 mg total) by mouth daily with breakfast.      zolpidem 5 MG Oral Tab Take 1 tablet (5 mg total) by mouth nightly as needed for Sleep. AT BEDTIME      minocycline 50 MG Oral Cap TAKE 1 CAPSULE BY MOUTH DAILY 60 capsule 5    Tretinoin 0.05 % External Cream APPLY TO ENTIRE FACE AND CHEST FOR MANAGEMENT OF PRE-CANCER AS DIRECTED EVERY NIGHT AT BEDTIME 45 g 5    Azelaic Acid (FINACEA) 15 % External Gel APPLY TO AFFECTED AREA(S) EVERY MORNING 50 g 2    fluorouracil 5 % External Cream Use 2 times weekly for 6 weeks to forehead at hairline, and chest 40 g 2    PROLENSA 0.07 % Ophthalmic Solution       budesonide 3 MG Oral Cap DR Particles       escitalopram 10 MG Oral Tab Take 1 tablet (10 mg total) by mouth daily.      ofloxacin 0.3 %  Ophthalmic Solution       rosuvastatin 10 MG Oral Tab Take 1 tablet (10 mg total) by mouth daily.      hydrocortisone 2.5 % External Cream Apply 1 Application topically 2 (two) times daily. Apply every morning and evening. 30 g 3    tretinoin 0.025 % External Cream APPLY TO AFFECTED AREA(S) EVERY DAY 20 g 3    Minocycline HCl Does not apply Powder Take by mouth.      alendronate 70 MG Oral Tab Take 1 tablet (70 mg total) by mouth.      alendronate 70 MG Oral Tab TK 1 T PO WEEKLY  2    erythromycin 5 MG/GM Ophthalmic Ointment   3    hydrocortisone 2.5 % External Ointment Apply thin layer to affected area twice daily as needed for up to 2 weeks.      hydrocortisone 2.5 % External Ointment CARA THIN LAYER EXT AA BID FOR UP TO 2 WKS PRN  1    Neomycin-Polymyxin-Dexameth 3.5-29772-7.1 Ophthalmic Ointment   0    GAVILYTE-G 236 g Oral Recon Soln TAKE 4000 ML PO AS ONE DOSE  0    prednisoLONE acetate 1 % Ophthalmic Suspension       brimonidine Tartrate 0.2 % Ophthalmic Solution INT 1 GTT INTO OU BID  3    Ketotifen Fumarate 0.025 % Ophthalmic Solution 1 drop.      latanoprost 0.005 % Ophthalmic Solution INT 1 GTT INTO OU NIGHTLY  3    Timolol Maleate 0.5 % Ophthalmic Solution   3    loratadine 10 MG Oral Tab Take 1 tablet (10 mg total) by mouth daily.      multiple vitamin Oral Chew Tab Chew 1 tablet by mouth daily.      Montelukast Sodium (SINGULAIR) 10 MG Oral Tab   0    alprazolam (XANAX) 1 MG Oral Tab   4    Desonide (DESOWEN) 0.05 % Apply Externally Cream Apply topically.      triamcinolone acetonide (KENALOG) 0.1 % Apply Externally Cream Apply topically.      anastrozole 1 MG Oral Tab tab Take by mouth.        Fluticasone Propionate (FLONASE) 50 MCG/ACT Nasal Suspension   11    Ipratropium Bromide (ATROVENT) 0.06 % Nasal Solution   1    simvastatin (ZOCOR) 10 MG Oral Tab   11    folic acid (FOLVITE) 1 MG Oral Tab   0    gabapentin (NEURONTIN) 300 MG Oral Cap   2    HYDROcodone-acetaminophen (NORCO) 5-325 MG Oral Tab    0    Lisinopril-Hydrochlorothiazide 10-12.5 MG Oral Tab   11    methotrexate (RHEUMATREX) 2.5 MG Oral Tab   1    naproxen (NAPROSYN) 500 MG Oral Tab   2    Sertraline HCl (ZOLOFT) 100 MG Oral Tab   11    Zolpidem Tartrate (AMBIEN) 10 MG Oral Tab   4    LUTEIN OR Take 1 tablet by mouth daily.       Allergies:   Allergies   Allergen Reactions    Adhesive Tape      CLOTH TAPE*    Dust Mite Extract      Molds, Grass, Trees       Past Medical History:    Age-related nuclear cataract of both eyes    Allergic contact dermatitis of eyelids of both eyes    Breast cancer (HCC)    per [facility]:  lumpectomy    Eye exam due to high risk medication, encounter for    Long-term use of Plaquenil    Other and unspecified hyperlipidemia    Rosacea    SCC (squamous cell carcinoma), arm, left    left anterio upper arm     Past Surgical History:   Procedure Laterality Date    Colonoscopy       Social History     Socioeconomic History    Marital status: Unknown     Spouse name: Not on file    Number of children: Not on file    Years of education: Not on file    Highest education level: Not on file   Occupational History    Not on file   Tobacco Use    Smoking status: Former     Current packs/day: 0.00     Types: Cigarettes     Quit date: 1990     Years since quittin.4     Passive exposure: Past    Smokeless tobacco: Never   Vaping Use    Vaping status: Never Used   Substance and Sexual Activity    Alcohol use: Yes     Alcohol/week: 0.0 standard drinks of alcohol     Comment: 1-2 drinks a week     Drug use: No    Sexual activity: Not on file   Other Topics Concern    Grew up on a farm No    History of tanning Yes    Outdoor occupation No    Pt has a pacemaker No    Pt has a defibrillator No    Breast feeding No    Reaction to local anesthetic No   Social History Narrative    Not on file     Social Determinants of Health     Financial Resource Strain: Patient Declined (2024)    Received from [facility]     Overall Financial Resource Strain (CARDIA)     Difficulty of Paying Living Expenses: Patient declined   Food Insecurity: No Food Insecurity (5/6/2024)    Received from [facility]    Hunger Vital Sign     Worried About Running Out of Food in the Last Year: Never true     Ran Out of Food in the Last Year: Never true   Transportation Needs: Patient Declined (5/6/2024)    Received from [facility]    PRAPARE - Transportation     Lack of Transportation (Medical): Patient declined     Lack of Transportation (Non-Medical): Patient declined   Physical Activity: Not on file   Stress: Not on file   Social Connections: Unknown (3/11/2021)    [facility]    Social Connections     Conversations with friends/family/neighbors per week: Not on file   Housing Stability: Patient Declined (5/6/2024)    Received from [facility]    Housing Stability Vital Sign     Unable to Pay for Housing in the Last Year: Patient declined     Number of Places Lived in the Last Year: Not on file     In the last 12 months, was there a time when you did not have a steady place to sleep or slept in a shelter (including now)?: Patient declined     Family History   Problem Relation Age of Onset    Diabetes Mother     Glaucoma Neg     Macular degeneration Neg        There were no vitals filed for this visit.    HPI:    Chief Complaint   Patient presents with    Upper Body Exam     Hx of Aks and SCC.  LOV 11/2023.  Pt presents for upper body exam.  Pt would like her face evaluated.  Denies bleeding or pain to the face.         Follow-up history AK's, SCC.  N    Uses Efudex intermittently      Has still been using her tretinoin and azelaic acid is on minocycline for rosacea  Noted redness and irritation  As noted recently:  On minocycline for acne, rosacea.  Has used Retin-A 0.025% cream previously as well.  Tolerating has been  outdoors in the sun does try to use sunscreen on the face    History of actinic keratoses, status post ENC of SCC at left shoulder in November 2020.  Had seen Dr. Sanchez recently who confirmed adequate treatment per current guidelines no further excision as no clinical evidence of persistent lesion.  Patient does have Efudex has not started this as recommended.  Again has not used the fluorouracil    Patient on long-term Plaquenil, methotrexate.  On Arimidex.    Lots of sun exposure in the past.  Minocycline has been working okay.  Had GI upset with doxycycline.  Uses topicals    Patient presents with concerns above.    Patient has been in their usual state of health.  History, medications, allergies reviewed as noted.      ROS:  Denies any other systemic complaints.  No new or changeing lesions other than noted above. No fevers, chills, night sweats, unusual sun sensitivity.  No other skin complaints.        History, medications, allergies reviewed as noted.       Physical Examination:     Well-developed well-nourished patient alert oriented in no acute distress.  Exam total-body performed, including scalp, head, neck, face,nails, hair, external eyes, including conjunctival mucosa, eyelids, lips external ears, back, chest,/ breasts, axillae,  abdomen, arms, legs, palms.     Multiple light to medium brown, well marginated, uniformly pigmented, macules and papules 6 mm and less scattered on exam. pigmented lesions examined with dermoscopy benign-appearing patterns.     Waxy tannish keratotic papules scattered, cherry-red vascular papules scattered.    See map today's date for lesions noted .    Extensive lentigines, actinic damage.  Otherwise remarkable for lesions as noted on map.  See Assessment /Plan for additional history and physical exam also:    Assessment / plan:    No orders of the defined types were placed in this encounter.      Meds & Refills for this Visit:  Requested Prescriptions     Pending  Prescriptions Disp Refills    minocycline 50 MG Oral Cap 60 capsule 5     Sig: TAKE 1 CAPSULE BY MOUTH DAILY         Encounter Diagnoses   Name Primary?    AK (actinic keratosis) Yes    Seborrheic keratoses     Lentigo     Benign neoplasm of skin, unspecified location     Multiple nevi     Rosacea     Medication monitoring encounter     Sun-damaged skin     Encounter for follow-up surveillance of skin cancer      Patient seen for follow-up long-term monitoring, treatment of  AK's, skin cancer rosacea sun damage  Plan of care:  ongoing surveillance, monitoring including regular follow-up due to longer term risk of recurrence, new lesions.  See previous notes.      Erythematous scaling keratotic papules noted at sites noted on map  Actinic Keratoses.  Precancerous nature discussed. Sun protection, sunscreen/ blocks encouraged Lesions treated with cryo- .  Biopsy if not resolved.    Forehead temples left orbit neck [id number]    Repeat Efudex to these areas above nasal dorsum and chest improved  Efudex instructions  Twice weekly for 6 weeks  Repeat course at least 2 areas of lesions treated    Nothing else new and suspicious  SCC left anterior upper arm 1.5 cm at time of ENC treated with ENC 11/2020 no recurrence clinically presently or noted by Dr. Sanchez.  Would concur with recommendation for further fluorouracil.    Overall doing much better continue careful monitoring follow-up every 6 months  History of SCC, numerous AK's over the arms hands improving  Stable doing well     overall otherwise stable multiple benign keratoses noted  Waxy tan keratotic papules lesions in areas of concern as noted reassurance given.  Benign nature discussed.  Possibility of cryo, alphahydroxy acids over-the-counter retinol's discussed.    Suggest this on the chest as well given background of sun damage    In addition start tretinoin for chemoprevention in between courses of fluorouracil especially over the chest nose temples  reviewed  Prominent solar elastosis noted  Continue careful sun protection patient using sunscreen inconsistently      Left pretibial leg lesion evaluated previously treated with cryo-verrucoid keratosis appears resolved monitor    More diffuse sun damage consider additional topicals.  Continue sunscreen, tretinoin    Acneiform papules over the cheeks chin jawline face.  Improved Retin-A.  Overall improved continue over-the-counter retinol's for lentigines over the cheeks discussed tretinoin, versus over-the-counter products   and chemoprevention as well as rosacea continue careful sun protection moisturizers.  Patient notes tretinoin very helpful for acne still breaks out mask seems to make this worse.  Tolerating tretinoin without any problems    Rosacea, large inflammatory acne lesions doing well on minocycline continue current medication.  Consider tapering few larger inflammatory papular nodules at times.  Continue topicals also refill if needed see medications in grid.  Skin care regimen discussed at length including cleansers, makeup, face washing, sunscreen.  Recheck if continued flaring.  Notify us promptly if problems tolerating regimen.  Consider more aggressive therapy if not responding.  Discussed possible decrease in minocycline patient apprehensive we will see how she does over the fall consider decreasing to every other day    Lesions of concern waxy tan keratotic papules consistent with benign seborrheic keratoses over the back thigh legs axilla scalp    Multiple benign keratoses lentigines extensive lesions.  Sun damage moderate to severe especially chest arms.  Careful sun protection.  Discussed over-the-counter retinol's.  Alphahydroxy acids.  Sun protection.  Continue careful follow-up  Multiple benign keratoses.  No new suspicious lesions.    Extensive lentigines, sun damage.  Continue regular follow-up stressed again need for sun protection    Patient high risk for further skin  cancers    Please refer to map for specific lesions.  See additional diagnoses.  Pros cons of various therapies, risks benefits discussed.Pathophysiology discussed with patient.  Therapeutic options reviewed.  See  Medications in grid.  Instructions reviewed at length.    Benign nevi, seborrheic  keratoses, cherry angiomas:  Reassurance regarding other benign skin lesions.Signs and symptoms of skin cancer, ABCDE's of melanoma discussed with patient. Sunscreen use, sun protection, self exams reviewed.  Followup as noted RTC   Recheck 2-3 months or as needed    Encounter Times  Including precharting, reviewing chart, prior notes obtaining history: 5 minutes, medical exam :10 minutes, notes on body map, plan, counseling 10minutes My total time spent caring for the patient on the day of the encounter: 25 minutes       The patient indicates understanding of these issues and agrees to the plan.  The patient is asked to return as noted in follow-up/ above.    This note was generated using Dragon voice recognition software.  Please contact me regarding any confusion resulting from errors in recognition.

## 2024-08-13 ENCOUNTER — OFFICE VISIT (OUTPATIENT)
Dept: OTOLARYNGOLOGY | Facility: CLINIC | Age: 74
End: 2024-08-13

## 2024-08-13 DIAGNOSIS — H61.23 BILATERAL IMPACTED CERUMEN: Primary | ICD-10-CM

## 2024-08-13 PROCEDURE — 69210 REMOVE IMPACTED EAR WAX UNI: CPT | Performed by: STUDENT IN AN ORGANIZED HEALTH CARE EDUCATION/TRAINING PROGRAM

## 2024-08-13 RX ORDER — MIRTAZAPINE 7.5 MG/1
TABLET, FILM COATED ORAL
COMMUNITY
Start: 2024-08-12

## 2024-08-13 NOTE — PROGRESS NOTES
Alexandra Leung is a 74 year old female.    Chief Complaint   Patient presents with    Ear Wax     Ear cleaning    Dizziness     Vertigo symptoms       HISTORY OF PRESENT ILLNESS    Patient presents for cerumen removal. No other complaints or concerns at this time    Social History     Socioeconomic History    Marital status: Unknown   Tobacco Use    Smoking status: Former     Current packs/day: 0.00     Types: Cigarettes     Quit date: 1990     Years since quittin.6     Passive exposure: Past    Smokeless tobacco: Never   Vaping Use    Vaping status: Never Used   Substance and Sexual Activity    Alcohol use: Yes     Alcohol/week: 0.0 standard drinks of alcohol     Comment: 1-2 drinks a week     Drug use: No   Other Topics Concern    [place] up on a farm No    History of tanning Yes    Outdoor occupation No    Pt has a pacemaker No    Pt has a defibrillator No    Breast feeding No    Reaction to local anesthetic No       Family History   Problem Relation Age of Onset    Diabetes Mother     Glaucoma Neg     Macular degeneration Neg        Past Medical History:    Age-related nuclear cataract of both eyes    Allergic contact dermatitis of eyelids of both eyes    Breast cancer (HCC)    per [facility]:  lumpectomy    Eye exam due to high risk medication, encounter for    Long-term use of Plaquenil    Other and unspecified hyperlipidemia    Rosacea    SCC (squamous cell carcinoma), arm, left    left anterio upper arm       Past Surgical History:   Procedure Laterality Date    Colonoscopy         REVIEW OF SYSTEMS    System Neg/Pos Details   Constitutional Negative Fatigue, fever and weight loss.   ENMT Negative Drooling.   Eyes Negative Blurred vision and vision changes.   Respiratory Negative Dyspnea and wheezing.   Cardio Negative Chest pain, irregular heartbeat/palpitations and syncope.   GI Negative Abdominal pain and diarrhea.   Endocrine Negative Cold intolerance and heat intolerance.   Neuro Negative  Tremors.   Psych Negative Anxiety and depression.   Integumentary Negative Frequent skin infections, pigment change and rash.   Hema/Lymph Negative Easy bleeding and easy bruising.           PHYSICAL EXAM    There were no vitals taken for this visit.       Constitutional Normal Overall appearance - Normal.        Neck Exam Normal Inspection - Normal. Palpation - Normal. Parotid gland - Normal. Thyroid gland - Normal.             Head/Face Normal Facial features - Normal. Eyebrows - Normal. Skull - Normal.             Ears Normal Inspection - Right: Normal, Left: Normal. Canal - Right: Normal, Left: Normal. TM - Right: Normal, Left: Normal.   Skin Normal Inspection - Normal.                              Canals:  Right: Canal reveals cerumen impaction,   Left: Canal reveals cerumen impaction,     Tympanic Membranes:  Right: Normal tympanic membrane.   Left: Normal tympanic membrane.     TM Visualized Method:   Right TM examined via otomicroscopy.    Left TM examined via otomicroscopy.      PROCEDURE:    Removal of cerumen impaction   The cerumen impaction was completely removed using microscopy.   Removal was completed by using acurette and/or suction.   Comments: Return to clinic as needed.  Avoid q-tips, water precautions and use over the counter wax remedies as needed.      Current Outpatient Medications:     mirtazapine 7.5 MG Oral Tab, , Disp: , Rfl:     minocycline 50 MG Oral Cap, TAKE 1 CAPSULE BY MOUTH DAILY, Disp: 60 capsule, Rfl: 5    buPROPion 75 MG Oral Tab, Take 1 tablet (75 mg total) by mouth daily with breakfast., Disp: , Rfl:     clonazePAM 0.125 MG Oral Tablet Dispersible, 1 tablet (0.125 mg total) daily as needed., Disp: , Rfl:     LUTEIN OR, Take 1 tablet by mouth daily., Disp: , Rfl:     metRONIDAZOLE 500 MG Oral Tab, Take 1 tablet (500 mg total) by mouth 3 (three) times daily., Disp: , Rfl:     escitalopram 20 MG Oral Tab, Take 1 tablet (20 mg total) by mouth daily with breakfast., Disp: , Rfl:      zolpidem 5 MG Oral Tab, Take 1 tablet (5 mg total) by mouth nightly as needed for Sleep. AT BEDTIME, Disp: , Rfl:     Tretinoin 0.05 % External Cream, APPLY TO ENTIRE FACE AND CHEST FOR MANAGEMENT OF PRE-CANCER AS DIRECTED EVERY NIGHT AT BEDTIME, Disp: 45 g, Rfl: 5    Azelaic Acid (FINACEA) 15 % External Gel, APPLY TO AFFECTED AREA(S) EVERY MORNING, Disp: 50 g, Rfl: 2    fluorouracil 5 % External Cream, Use 2 times weekly for 6 weeks to forehead at hairline, and chest, Disp: 40 g, Rfl: 2    PROLENSA 0.07 % Ophthalmic Solution, , Disp: , Rfl:     budesonide 3 MG Oral Cap DR Particles, , Disp: , Rfl:     escitalopram 10 MG Oral Tab, Take 1 tablet (10 mg total) by mouth daily., Disp: , Rfl:     ofloxacin 0.3 % Ophthalmic Solution, , Disp: , Rfl:     rosuvastatin 10 MG Oral Tab, Take 1 tablet (10 mg total) by mouth daily., Disp: , Rfl:     hydrocortisone 2.5 % External Cream, Apply 1 Application topically 2 (two) times daily. Apply every morning and evening., Disp: 30 g, Rfl: 3    tretinoin 0.025 % External Cream, APPLY TO AFFECTED AREA(S) EVERY DAY, Disp: 20 g, Rfl: 3    Minocycline HCl Does not apply Powder, Take by mouth., Disp: , Rfl:     alendronate 70 MG Oral Tab, Take 1 tablet (70 mg total) by mouth., Disp: , Rfl:     alendronate 70 MG Oral Tab, TK 1 T PO WEEKLY, Disp: , Rfl: 2    erythromycin 5 MG/GM Ophthalmic Ointment, , Disp: , Rfl: 3    hydrocortisone 2.5 % External Ointment, Apply thin layer to affected area twice daily as needed for up to 2 weeks., Disp: , Rfl:     hydrocortisone 2.5 % External Ointment, CARA THIN LAYER EXT AA BID FOR UP TO 2 WKS PRN, Disp: , Rfl: 1    Neomycin-Polymyxin-Dexameth 3.5-16732-7.1 Ophthalmic Ointment, , Disp: , Rfl: 0    GAVILYTE-G 236 g Oral Recon Soln, TAKE 4000 ML PO AS ONE DOSE, Disp: , Rfl: 0    prednisoLONE acetate 1 % Ophthalmic Suspension, , Disp: , Rfl:     brimonidine Tartrate 0.2 % Ophthalmic Solution, INT 1 GTT INTO OU BID, Disp: , Rfl: 3    Ketotifen Fumarate  0.025 % Ophthalmic Solution, 1 drop., Disp: , Rfl:     latanoprost 0.005 % Ophthalmic Solution, INT 1 GTT INTO OU NIGHTLY, Disp: , Rfl: 3    Timolol Maleate 0.5 % Ophthalmic Solution, , Disp: , Rfl: 3    loratadine 10 MG Oral Tab, Take 1 tablet (10 mg total) by mouth daily., Disp: , Rfl:     multiple vitamin Oral Chew Tab, Chew 1 tablet by mouth daily., Disp: , Rfl:     Montelukast Sodium (SINGULAIR) 10 MG Oral Tab, , Disp: , Rfl: 0    alprazolam (XANAX) 1 MG Oral Tab, , Disp: , Rfl: 4    Desonide (DESOWEN) 0.05 % Apply Externally Cream, Apply topically., Disp: , Rfl:     triamcinolone acetonide (KENALOG) 0.1 % Apply Externally Cream, Apply topically., Disp: , Rfl:     anastrozole 1 MG Oral Tab tab, Take by mouth.  , Disp: , Rfl:     Fluticasone Propionate (FLONASE) 50 MCG/ACT Nasal Suspension, , Disp: , Rfl: 11    Ipratropium Bromide (ATROVENT) 0.06 % Nasal Solution, , Disp: , Rfl: 1    simvastatin (ZOCOR) 10 MG Oral Tab, , Disp: , Rfl: 11    folic acid (FOLVITE) 1 MG Oral Tab, , Disp: , Rfl: 0    gabapentin (NEURONTIN) 300 MG Oral Cap, , Disp: , Rfl: 2    HYDROcodone-acetaminophen (NORCO) 5-325 MG Oral Tab, , Disp: , Rfl: 0    Lisinopril-Hydrochlorothiazide 10-12.5 MG Oral Tab, , Disp: , Rfl: 11    methotrexate (RHEUMATREX) 2.5 MG Oral Tab, , Disp: , Rfl: 1    naproxen (NAPROSYN) 500 MG Oral Tab, , Disp: , Rfl: 2    Sertraline HCl (ZOLOFT) 100 MG Oral Tab, , Disp: , Rfl: 11    Zolpidem Tartrate (AMBIEN) 10 MG Oral Tab, , Disp: , Rfl: 4  ASSESSMENT AND PLAN    1. Bilateral impacted cerumen      All cerumen was removed using microscopy. I have asked the patient to return to see me as needed for repeat cerumen removal in the future.      Sid Donaldson MD    8/13/2024    4:34 PM

## 2024-08-26 ENCOUNTER — OFFICE VISIT (OUTPATIENT)
Dept: DERMATOLOGY CLINIC | Facility: CLINIC | Age: 74
End: 2024-08-26

## 2024-08-26 DIAGNOSIS — L82.1 SEBORRHEIC KERATOSES: ICD-10-CM

## 2024-08-26 DIAGNOSIS — L57.0 AK (ACTINIC KERATOSIS): Primary | ICD-10-CM

## 2024-08-26 DIAGNOSIS — L71.9 ROSACEA: ICD-10-CM

## 2024-08-26 DIAGNOSIS — D22.9 MULTIPLE NEVI: ICD-10-CM

## 2024-08-26 DIAGNOSIS — L81.4 LENTIGO: ICD-10-CM

## 2024-08-26 DIAGNOSIS — Z51.81 MEDICATION MONITORING ENCOUNTER: ICD-10-CM

## 2024-08-26 DIAGNOSIS — Z08 ENCOUNTER FOR FOLLOW-UP SURVEILLANCE OF SKIN CANCER: ICD-10-CM

## 2024-08-26 DIAGNOSIS — D23.9 BENIGN NEOPLASM OF SKIN, UNSPECIFIED LOCATION: ICD-10-CM

## 2024-08-26 DIAGNOSIS — Z85.828 ENCOUNTER FOR FOLLOW-UP SURVEILLANCE OF SKIN CANCER: ICD-10-CM

## 2024-08-26 RX ORDER — NETARSUDIL AND LATANOPROST OPHTHALMIC SOLUTION, 0.02%/0.005% .2; .05 MG/ML; MG/ML
1 SOLUTION/ DROPS OPHTHALMIC; TOPICAL NIGHTLY
COMMUNITY
Start: 2024-08-14

## 2024-09-02 NOTE — PROGRESS NOTES
Alexandra Leung is a 74 year old female.  HPI:     CC:    Chief Complaint   Patient presents with    Derm Problem     LOV 24. Pt presents for spots on top middle of scalp F/U.   Spot is tender to touch, bleeding present since it scabs.   Pt presents for F/U of spots on upper chest  Pt also has dry spots for few mths on right/left temple area that is itchy.     Pt has personal hx of Aks, SCC(left arm)         Allergies:  Adhesive tape and Dust mite extract    HISTORY:    Past Medical History:    Age-related nuclear cataract of both eyes    Allergic contact dermatitis of eyelids of both eyes    Breast cancer (HCC)    per [facility]:  lumpectomy    Eye exam due to high risk medication, encounter for    Long-term use of Plaquenil    Other and unspecified hyperlipidemia    Rosacea    SCC (squamous cell carcinoma), arm, left    left anterio upper arm      Past Surgical History:   Procedure Laterality Date    Colonoscopy        Family History   Problem Relation Age of Onset    Diabetes Mother     Glaucoma Neg     Macular degeneration Neg       Social History     Socioeconomic History    Marital status: Unknown   Tobacco Use    Smoking status: Former     Current packs/day: 0.00     Types: Cigarettes     Quit date: 1990     Years since quittin.6     Passive exposure: Past    Smokeless tobacco: Never   Vaping Use    Vaping status: Never Used   Substance and Sexual Activity    Alcohol use: Yes     Alcohol/week: 0.0 standard drinks of alcohol     Comment: 1-2 drinks a week     Drug use: No   Other Topics Concern    Grew up on a farm No    History of tanning Yes    Outdoor occupation No    Pt has a pacemaker No    Pt has a defibrillator No    Breast feeding No    Reaction to local anesthetic No     Social Determinants of Health     Financial Resource Strain: Patient Declined (2024)    Received from [facility]    Overall Financial Resource Strain (CARDIA)     Difficulty of Paying Living  Expenses: Patient declined   Food Insecurity: No Food Insecurity (5/6/2024)    Received from [facility]    Hunger Vital Sign     Worried About Running Out of Food in the Last Year: Never true     Ran Out of Food in the Last Year: Never true   Transportation Needs: Patient Declined (5/6/2024)    Received from [facility]    PRAPARE - Transportation     Lack of Transportation (Medical): Patient declined     Lack of Transportation (Non-Medical): Patient declined    [facility]    Social Connections   Housing Stability: Patient Declined (5/6/2024)    Received from [facility]    Housing Stability Vital Sign     Unable to Pay for Housing in the Last Year: Patient declined     In the last 12 months, was there a time when you did not have a steady place to sleep or slept in a shelter (including now)?: Patient declined        Current Outpatient Medications   Medication Sig Dispense Refill    ROCKLATAN 0.02-0.005 % Ophthalmic Solution Place 1 drop into both eyes nightly.      mirtazapine 7.5 MG Oral Tab       minocycline 50 MG Oral Cap TAKE 1 CAPSULE BY MOUTH DAILY 60 capsule 5    buPROPion 75 MG Oral Tab Take 1 tablet (75 mg total) by mouth daily with breakfast.      clonazePAM 0.125 MG Oral Tablet Dispersible 1 tablet (0.125 mg total) daily as needed.      LUTEIN OR Take 1 tablet by mouth daily.      metRONIDAZOLE 500 MG Oral Tab Take 1 tablet (500 mg total) by mouth 3 (three) times daily.      escitalopram 20 MG Oral Tab Take 1 tablet (20 mg total) by mouth daily with breakfast.      zolpidem 5 MG Oral Tab Take 1 tablet (5 mg total) by mouth nightly as needed for Sleep. AT BEDTIME      Azelaic Acid (FINACEA) 15 % External Gel APPLY TO AFFECTED AREA(S) EVERY MORNING 50 g 2    fluorouracil 5 % External Cream Use 2 times weekly for 6 weeks to forehead at hairline, and chest 40 g 2    PROLENSA 0.07  % Ophthalmic Solution       budesonide 3 MG Oral Cap DR Particles       escitalopram 10 MG Oral Tab Take 1 tablet (10 mg total) by mouth daily.      ofloxacin 0.3 % Ophthalmic Solution       rosuvastatin 10 MG Oral Tab Take 1 tablet (10 mg total) by mouth daily.      hydrocortisone 2.5 % External Cream Apply 1 Application topically 2 (two) times daily. Apply every morning and evening. 30 g 3    Minocycline HCl Does not apply Powder Take by mouth.      alendronate 70 MG Oral Tab Take 1 tablet (70 mg total) by mouth.      alendronate 70 MG Oral Tab TK 1 T PO WEEKLY  2    erythromycin 5 MG/GM Ophthalmic Ointment   3    hydrocortisone 2.5 % External Ointment Apply thin layer to affected area twice daily as needed for up to 2 weeks.      hydrocortisone 2.5 % External Ointment CARA THIN LAYER EXT AA BID FOR UP TO 2 WKS PRN  1    Neomycin-Polymyxin-Dexameth 3.5-25864-2.1 Ophthalmic Ointment   0    GAVILYTE-G 236 g Oral Recon Soln TAKE 4000 ML PO AS ONE DOSE  0    prednisoLONE acetate 1 % Ophthalmic Suspension       brimonidine Tartrate 0.2 % Ophthalmic Solution INT 1 GTT INTO OU BID  3    Ketotifen Fumarate 0.025 % Ophthalmic Solution 1 drop.      latanoprost 0.005 % Ophthalmic Solution INT 1 GTT INTO OU NIGHTLY  3    Timolol Maleate 0.5 % Ophthalmic Solution   3    loratadine 10 MG Oral Tab Take 1 tablet (10 mg total) by mouth daily.      multiple vitamin Oral Chew Tab Chew 1 tablet by mouth daily.      Montelukast Sodium (SINGULAIR) 10 MG Oral Tab   0    alprazolam (XANAX) 1 MG Oral Tab   4    Desonide (DESOWEN) 0.05 % Apply Externally Cream Apply topically.      anastrozole 1 MG Oral Tab tab Take by mouth.        Fluticasone Propionate (FLONASE) 50 MCG/ACT Nasal Suspension   11    Ipratropium Bromide (ATROVENT) 0.06 % Nasal Solution   1    simvastatin (ZOCOR) 10 MG Oral Tab   11    folic acid (FOLVITE) 1 MG Oral Tab   0    gabapentin (NEURONTIN) 300 MG Oral Cap   2    HYDROcodone-acetaminophen (NORCO) 5-325 MG Oral Tab    0    Lisinopril-Hydrochlorothiazide 10-12.5 MG Oral Tab   11    methotrexate (RHEUMATREX) 2.5 MG Oral Tab   1    naproxen (NAPROSYN) 500 MG Oral Tab   2    Sertraline HCl (ZOLOFT) 100 MG Oral Tab   11    Zolpidem Tartrate (AMBIEN) 10 MG Oral Tab   4    Tretinoin 0.05 % External Cream APPLY TO ENTIRE FACE AND CHEST FOR MANAGEMENT OF PRE-CANCER AS DIRECTED EVERY NIGHT AT BEDTIME (Patient not taking: Reported on 2024) 45 g 5    tretinoin 0.025 % External Cream APPLY TO AFFECTED AREA(S) EVERY DAY (Patient not taking: Reported on 2024) 20 g 3    triamcinolone acetonide (KENALOG) 0.1 % Apply Externally Cream Apply topically. (Patient not taking: Reported on 2024)       Allergies:   Allergies   Allergen Reactions    Adhesive Tape      CLOTH TAPE*    Dust Mite Extract      Molds, Grass, Trees       Past Medical History:    Age-related nuclear cataract of both eyes    Allergic contact dermatitis of eyelids of both eyes    Breast cancer (HCC)    per [facility]:  lumpectomy    Eye exam due to high risk medication, encounter for    Long-term use of Plaquenil    Other and unspecified hyperlipidemia    Rosacea    SCC (squamous cell carcinoma), arm, left    left anterio upper arm     Past Surgical History:   Procedure Laterality Date    Colonoscopy       Social History     Socioeconomic History    Marital status: Unknown     Spouse name: Not on file    Number of children: Not on file    Years of education: Not on file    Highest education level: Not on file   Occupational History    Not on file   Tobacco Use    Smoking status: Former     Current packs/day: 0.00     Types: Cigarettes     Quit date: 1990     Years since quittin.6     Passive exposure: Past    Smokeless tobacco: Never   Vaping Use    Vaping status: Never Used   Substance and Sexual Activity    Alcohol use: Yes     Alcohol/week: 0.0 standard drinks of alcohol     Comment: 1-2 drinks a week     Drug use: No    Sexual activity: Not on file   Other  Topics Concern    Grew up on a farm No    History of tanning Yes    Outdoor occupation No    Pt has a pacemaker No    Pt has a defibrillator No    Breast feeding No    Reaction to local anesthetic No   Social History Narrative    Not on file     Social Determinants of Health     Financial Resource Strain: Patient Declined (5/6/2024)    Received from [facility]    Overall Financial Resource Strain (CARDIA)     Difficulty of Paying Living Expenses: Patient declined   Food Insecurity: No Food Insecurity (5/6/2024)    Received from [facility]    Hunger Vital Sign     Worried About Running Out of Food in the Last Year: Never true     Ran Out of Food in the Last Year: Never true   Transportation Needs: Patient Declined (5/6/2024)    Received from [facility]    PRAPARE - Transportation     Lack of Transportation (Medical): Patient declined     Lack of Transportation (Non-Medical): Patient declined   Physical Activity: Not on file   Stress: Not on file   Social Connections: Unknown (3/11/2021)    [facility]    Social Connections     Conversations with friends/family/neighbors per week: Not on file   Housing Stability: Patient Declined (5/6/2024)    Received from [facility]    Housing Stability Vital Sign     Unable to Pay for Housing in the Last Year: Patient declined     Number of Places Lived in the Last Year: Not on file     In the last 12 months, was there a time when you did not have a steady place to sleep or slept in a shelter (including now)?: Patient declined     Family History   Problem Relation Age of Onset    Diabetes Mother     Glaucoma Neg     Macular degeneration Neg        There were no vitals filed for this visit.    HPI:    Chief Complaint   Patient presents with    Derm Problem     LOV 05/16/24. Pt presents for spots on top middle of scalp F/U.   Spot is  tender to touch, bleeding present since it scabs.   Pt presents for F/U of spots on upper chest  Pt also has dry spots for few mths on right/left temple area that is itchy.     Pt has personal hx of Aks, SCC(left arm)       Follow-up history AK's, SCC.      Uses Efudex intermittently      Has still been using her tretinoin and azelaic acid is on minocycline for rosacea  Noted redness and irritation  As noted recently:  On minocycline for acne, rosacea.  Has used Retin-A 0.025% cream previously as well.  Tolerating has been outdoors in the sun does try to use sunscreen on the face    History of actinic keratoses, status post ENC of SCC at left shoulder in November 2020.  Had seen Dr. Sanchez recently who confirmed adequate treatment per current guidelines no further excision as no clinical evidence of persistent lesion.  Patient does have Efudex has not started this as recommended.  Again has not used the fluorouracil    Patient on long-term Plaquenil, methotrexate.  On Arimidex.    Lots of sun exposure in the past.  Minocycline has been working okay.  Had GI upset with doxycycline.  Uses topicals    Patient presents with concerns above.    Patient has been in their usual state of health.  History, medications, allergies reviewed as noted.      ROS:  Denies any other systemic complaints.  No new or changeing lesions other than noted above. No fevers, chills, night sweats, unusual sun sensitivity.  No other skin complaints.        History, medications, allergies reviewed as noted.       Physical Examination:     Well-developed well-nourished patient alert oriented in no acute distress.  Exam total-body performed, including scalp, head, neck, face,nails, hair, external eyes, including conjunctival mucosa, eyelids, lips external ears, back, chest,/ breasts, axillae,  abdomen, arms, legs, palms.     Multiple light to medium brown, well marginated, uniformly pigmented, macules and papules 6 mm and less scattered on exam.  pigmented lesions examined with dermoscopy benign-appearing patterns.     Waxy tannish keratotic papules scattered, cherry-red vascular papules scattered.    See map today's date for lesions noted .    Extensive lentigines, actinic damage.  Otherwise remarkable for lesions as noted on map.  See Assessment /Plan for additional history and physical exam also:    Assessment / plan:    No orders of the defined types were placed in this encounter.      Meds & Refills for this Visit:  Requested Prescriptions      No prescriptions requested or ordered in this encounter         Encounter Diagnoses   Name Primary?    AK (actinic keratosis) Yes    Lentigo     Seborrheic keratoses     Benign neoplasm of skin, unspecified location     Multiple nevi     Rosacea     Medication monitoring encounter     Encounter for follow-up surveillance of skin cancer      Patient seen for follow-up long-term monitoring, treatment of  AK's, skin cancer rosacea sun damage  Plan of care:  ongoing surveillance, monitoring including regular follow-up due to longer term risk of recurrence, new lesions.  See previous notes.      Erythematous scaling keratotic papules noted at sites noted on map  Actinic Keratoses.  Precancerous nature discussed. Sun protection, sunscreen/ blocks encouraged Lesions treated with cryo- .  Biopsy if not resolved.    Right forehead left temple    Areas on chest improved.  Efudex to these areas twice weekly for 6 weeks    Repeat Efudex to these areas above nasal dorsum and chestEfudex instructions  Twice weekly for 6 weeks  Repeat course at least 2 areas of lesions treated    Nothing else new and suspicious  SCC left anterior upper arm 1.5 cm at time of ENC treated with ENC 11/2020 no recurrence clinically presently or noted by Dr. Sanchez.  Would concur with recommendation for further fluorouracil.    Overall doing much better continue careful monitoring follow-up every 6 months  History of SCC, numerous AK's over the arms  hands improving  Stable doing well     overall otherwise stable multiple benign keratoses noted  Waxy tan keratotic papules lesions in areas of concern as noted reassurance given.  Benign nature discussed.  Possibility of cryo, alphahydroxy acids over-the-counter retinol's discussed.    Suggest this on the chest as well given background of sun damage    In addition start tretinoin for chemoprevention in between courses of fluorouracil especially over the chest nose temples reviewed  Prominent solar elastosis noted  Continue careful sun protection patient using sunscreen inconsistently      Left pretibial leg lesion evaluated previously treated with cryo-verrucoid keratosis appears resolved monitor    More diffuse sun damage consider additional topicals.  Continue sunscreen, tretinoin    Acneiform papules over the cheeks chin jawline face.  Improved Retin-A.  Overall improved continue over-the-counter retinol's for lentigines over the cheeks discussed tretinoin, versus over-the-counter products   and chemoprevention as well as rosacea continue careful sun protection moisturizers.  Patient notes tretinoin very helpful for acne still breaks out mask seems to make this worse.  Tolerating tretinoin without any problems    Rosacea, large inflammatory acne lesions doing well on minocycline continue current medication.  Consider tapering few larger inflammatory papular nodules at times.  Continue topicals also refill if needed see medications in grid.  Skin care regimen discussed at length including cleansers, makeup, face washing, sunscreen.  Recheck if continued flaring.  Notify us promptly if problems tolerating regimen.  Consider more aggressive therapy if not responding.  Discussed possible decrease in minocycline patient apprehensive we will see how she does over the fall consider decreasing to every other day    Lesions of concern waxy tan keratotic papules consistent with benign seborrheic keratoses over the back  thigh legs axilla scalp    Multiple benign keratoses lentigines extensive lesions.  Sun damage moderate to severe especially chest arms.  Careful sun protection.  Discussed over-the-counter retinol's.  Alphahydroxy acids.  Sun protection.  Continue careful follow-up  Multiple benign keratoses.  No new suspicious lesions.    Extensive lentigines, sun damage.  Continue regular follow-up stressed again need for sun protection    Patient high risk for further skin cancers    Please refer to map for specific lesions.  See additional diagnoses.  Pros cons of various therapies, risks benefits discussed.Pathophysiology discussed with patient.  Therapeutic options reviewed.  See  Medications in grid.  Instructions reviewed at length.    Benign nevi, seborrheic  keratoses, cherry angiomas:  Reassurance regarding other benign skin lesions.Signs and symptoms of skin cancer, ABCDE's of melanoma discussed with patient. Sunscreen use, sun protection, self exams reviewed.  Followup as noted RTC   Recheck 2-3 months or as needed    Encounter Times  Including precharting, reviewing chart, prior notes obtaining history: 5 minutes, medical exam :10 minutes, notes on body map, plan, counseling 10minutes My total time spent caring for the patient on the day of the encounter: 25 minutes       The patient indicates understanding of these issues and agrees to the plan.  The patient is asked to return as noted in follow-up/ above.    This note was generated using Dragon voice recognition software.  Please contact me regarding any confusion resulting from errors in recognition.

## 2024-11-13 ENCOUNTER — OFFICE VISIT (OUTPATIENT)
Dept: DERMATOLOGY CLINIC | Facility: CLINIC | Age: 74
End: 2024-11-13

## 2024-11-13 DIAGNOSIS — D22.9 MULTIPLE NEVI: ICD-10-CM

## 2024-11-13 DIAGNOSIS — L82.1 SEBORRHEIC KERATOSES: ICD-10-CM

## 2024-11-13 DIAGNOSIS — Z08 ENCOUNTER FOR FOLLOW-UP SURVEILLANCE OF SKIN CANCER: ICD-10-CM

## 2024-11-13 DIAGNOSIS — L81.4 LENTIGO: ICD-10-CM

## 2024-11-13 DIAGNOSIS — Z51.81 MEDICATION MONITORING ENCOUNTER: ICD-10-CM

## 2024-11-13 DIAGNOSIS — L57.0 AK (ACTINIC KERATOSIS): Primary | ICD-10-CM

## 2024-11-13 DIAGNOSIS — D23.9 BENIGN NEOPLASM OF SKIN, UNSPECIFIED LOCATION: ICD-10-CM

## 2024-11-13 DIAGNOSIS — L71.9 ROSACEA: ICD-10-CM

## 2024-11-13 DIAGNOSIS — Z85.828 ENCOUNTER FOR FOLLOW-UP SURVEILLANCE OF SKIN CANCER: ICD-10-CM

## 2024-11-13 RX ORDER — ESCITALOPRAM OXALATE 5 MG/1
5 TABLET ORAL
COMMUNITY
Start: 2024-10-08

## 2024-11-24 NOTE — PROGRESS NOTES
Alexandra Leung is a 74 year old female.  HPI:     CC:    Chief Complaint   Patient presents with    Hair/Scalp Problem     LOV 2024. Pt present w/ concern of hair loss. Mainly on top of scalp. States she noticed it began a couple months ago. Not using/ taking anything.          Allergies:  Adhesive tape and Dust mite extract    HISTORY:    Past Medical History:    Age-related nuclear cataract of both eyes    Allergic contact dermatitis of eyelids of both eyes    Breast cancer (HCC)    per [facility]:  lumpectomy    Eye exam due to high risk medication, encounter for    Long-term use of Plaquenil    Other and unspecified hyperlipidemia    Rosacea    SCC (squamous cell carcinoma), arm, left    left anterio upper arm      Past Surgical History:   Procedure Laterality Date    Colonoscopy        Family History   Problem Relation Age of Onset    Diabetes Mother     Glaucoma Neg     Macular degeneration Neg       Social History     Socioeconomic History    Marital status: Unknown   Tobacco Use    Smoking status: Former     Current packs/day: 0.00     Types: Cigarettes     Quit date: 1990     Years since quittin.9     Passive exposure: Past    Smokeless tobacco: Never   Vaping Use    Vaping status: Never Used   Substance and Sexual Activity    Alcohol use: Yes     Alcohol/week: 0.0 standard drinks of alcohol     Comment: 1-2 drinks a week     Drug use: No   Other Topics Concern    Grew up on a farm No    History of tanning Yes    Outdoor occupation No    Pt has a pacemaker No    Pt has a defibrillator No    Breast feeding No    Reaction to local anesthetic No     Social Drivers of Health     Financial Resource Strain: Patient Declined (2024)    Received from [facility]    Overall Financial Resource Strain (CARDIA)     Difficulty of Paying Living Expenses: Patient declined   Food Insecurity: No Food Insecurity (2024)    Received from [facility]    Hunger Vital  Sign     Worried About Running Out of Food in the Last Year: Never true     Ran Out of Food in the Last Year: Never true   Transportation Needs: Patient Declined (5/6/2024)    Received from [facility]    PRAPARE - Transportation     Lack of Transportation (Medical): Patient declined     Lack of Transportation (Non-Medical): Patient declined    [facility]    Social Connections   Housing Stability: Patient Declined (5/6/2024)    Received from [facility]    Housing Stability Vital Sign     Unable to Pay for Housing in the Last Year: Patient declined     Unstable Housing in the Last Year: Patient declined        Current Outpatient Medications   Medication Sig Dispense Refill    escitalopram 5 MG Oral Tab Take 1 tablet (5 mg total) by mouth daily with food.      ROCKLATAN 0.02-0.005 % Ophthalmic Solution Place 1 drop into both eyes nightly.      mirtazapine 7.5 MG Oral Tab       minocycline 50 MG Oral Cap TAKE 1 CAPSULE BY MOUTH DAILY 60 capsule 5    buPROPion 75 MG Oral Tab Take 1 tablet (75 mg total) by mouth daily with breakfast.      clonazePAM 0.125 MG Oral Tablet Dispersible 1 tablet (0.125 mg total) daily as needed.      LUTEIN OR Take 1 tablet by mouth daily.      metRONIDAZOLE 500 MG Oral Tab Take 1 tablet (500 mg total) by mouth 3 (three) times daily.      zolpidem 5 MG Oral Tab Take 1 tablet (5 mg total) by mouth nightly as needed for Sleep. AT BEDTIME      Azelaic Acid (FINACEA) 15 % External Gel APPLY TO AFFECTED AREA(S) EVERY MORNING 50 g 2    fluorouracil 5 % External Cream Use 2 times weekly for 6 weeks to forehead at hairline, and chest 40 g 2    PROLENSA 0.07 % Ophthalmic Solution       budesonide 3 MG Oral Cap DR Particles       ofloxacin 0.3 % Ophthalmic Solution       rosuvastatin 10 MG Oral Tab Take 1 tablet (10 mg total) by mouth daily.      hydrocortisone 2.5 % External Cream Apply 1  Application topically 2 (two) times daily. Apply every morning and evening. 30 g 3    Minocycline HCl Does not apply Powder Take by mouth.      alendronate 70 MG Oral Tab Take 1 tablet (70 mg total) by mouth.      alendronate 70 MG Oral Tab TK 1 T PO WEEKLY  2    erythromycin 5 MG/GM Ophthalmic Ointment   3    hydrocortisone 2.5 % External Ointment Apply thin layer to affected area twice daily as needed for up to 2 weeks.      hydrocortisone 2.5 % External Ointment CARA THIN LAYER EXT AA BID FOR UP TO 2 WKS PRN  1    Neomycin-Polymyxin-Dexameth 3.5-91906-4.1 Ophthalmic Ointment   0    GAVILYTE-G 236 g Oral Recon Soln TAKE 4000 ML PO AS ONE DOSE  0    prednisoLONE acetate 1 % Ophthalmic Suspension       brimonidine Tartrate 0.2 % Ophthalmic Solution INT 1 GTT INTO OU BID  3    Ketotifen Fumarate 0.025 % Ophthalmic Solution 1 drop.      latanoprost 0.005 % Ophthalmic Solution INT 1 GTT INTO OU NIGHTLY  3    Timolol Maleate 0.5 % Ophthalmic Solution   3    loratadine 10 MG Oral Tab Take 1 tablet (10 mg total) by mouth daily.      multiple vitamin Oral Chew Tab Chew 1 tablet by mouth daily.      Montelukast Sodium (SINGULAIR) 10 MG Oral Tab   0    alprazolam (XANAX) 1 MG Oral Tab   4    Desonide (DESOWEN) 0.05 % Apply Externally Cream Apply topically.      anastrozole 1 MG Oral Tab tab Take by mouth.        Fluticasone Propionate (FLONASE) 50 MCG/ACT Nasal Suspension   11    Ipratropium Bromide (ATROVENT) 0.06 % Nasal Solution   1    simvastatin (ZOCOR) 10 MG Oral Tab   11    folic acid (FOLVITE) 1 MG Oral Tab   0    gabapentin (NEURONTIN) 300 MG Oral Cap   2    HYDROcodone-acetaminophen (NORCO) 5-325 MG Oral Tab   0    Lisinopril-Hydrochlorothiazide 10-12.5 MG Oral Tab   11    methotrexate (RHEUMATREX) 2.5 MG Oral Tab   1    naproxen (NAPROSYN) 500 MG Oral Tab   2    Sertraline HCl (ZOLOFT) 100 MG Oral Tab   11    Zolpidem Tartrate (AMBIEN) 10 MG Oral Tab   4    escitalopram 20 MG Oral Tab Take 1 tablet (20 mg total) by  mouth daily with breakfast. (Patient not taking: Reported on 2024)      Tretinoin 0.05 % External Cream APPLY TO ENTIRE FACE AND CHEST FOR MANAGEMENT OF PRE-CANCER AS DIRECTED EVERY NIGHT AT BEDTIME (Patient not taking: Reported on 2024) 45 g 5    escitalopram 10 MG Oral Tab Take 1 tablet (10 mg total) by mouth daily. (Patient not taking: Reported on 2024)      tretinoin 0.025 % External Cream APPLY TO AFFECTED AREA(S) EVERY DAY (Patient not taking: Reported on 2024) 20 g 3    triamcinolone acetonide (KENALOG) 0.1 % Apply Externally Cream Apply topically. (Patient not taking: Reported on 2024)       Allergies:   Allergies   Allergen Reactions    Adhesive Tape      CLOTH TAPE*    Dust Mite Extract      Molds, Grass, Trees       Past Medical History:    Age-related nuclear cataract of both eyes    Allergic contact dermatitis of eyelids of both eyes    Breast cancer (HCC)    per [facility]:  lumpectomy    Eye exam due to high risk medication, encounter for    Long-term use of Plaquenil    Other and unspecified hyperlipidemia    Rosacea    SCC (squamous cell carcinoma), arm, left    left anterio upper arm     Past Surgical History:   Procedure Laterality Date    Colonoscopy       Social History     Socioeconomic History    Marital status: Unknown     Spouse name: Not on file    Number of children: Not on file    Years of education: Not on file    Highest education level: Not on file   Occupational History    Not on file   Tobacco Use    Smoking status: Former     Current packs/day: 0.00     Types: Cigarettes     Quit date: 1990     Years since quittin.9     Passive exposure: Past    Smokeless tobacco: Never   Vaping Use    Vaping status: Never Used   Substance and Sexual Activity    Alcohol use: Yes     Alcohol/week: 0.0 standard drinks of alcohol     Comment: 1-2 drinks a week     Drug use: No    Sexual activity: Not on file   Other Topics Concern    Grew up on a farm No    History  of tanning Yes    Outdoor occupation No    Pt has a pacemaker No    Pt has a defibrillator No    Breast feeding No    Reaction to local anesthetic No   Social History Narrative    Not on file     Social Drivers of Health     Financial Resource Strain: Patient Declined (5/6/2024)    Received from [facility]    Overall Financial Resource Strain (CARDIA)     Difficulty of Paying Living Expenses: Patient declined   Food Insecurity: No Food Insecurity (5/6/2024)    Received from [facility]    Hunger Vital Sign     Worried About Running Out of Food in the Last Year: Never true     Ran Out of Food in the Last Year: Never true   Transportation Needs: Patient Declined (5/6/2024)    Received from [facility]    PRAPARE - Transportation     Lack of Transportation (Medical): Patient declined     Lack of Transportation (Non-Medical): Patient declined   Physical Activity: Not on file   Stress: Not on file   Social Connections: Unknown (3/11/2021)    Received from [facility]    Social Connections     Conversations with friends/family/neighbors per week: Not on file   Housing Stability: Patient Declined (5/6/2024)    Received from [facility]    Housing Stability Vital Sign     Unable to Pay for Housing in the Last Year: Patient declined     Number of Places Lived in the Last Year: Not on file     Unstable Housing in the Last Year: Patient declined     Family History   Problem Relation Age of Onset    Diabetes Mother     Glaucoma Neg     Macular degeneration Neg        There were no vitals filed for this visit.    HPI:    Chief Complaint   Patient presents with    Hair/Scalp Problem     LOV 8/2024. Pt present w/ concern of hair loss. Mainly on top of scalp. States she noticed it began a couple months ago. Not using/ taking anything.      New concern hair loss anterior scalp  Per patient weight  has been stable nothing new or different.  Has noted slowly progressive thinning due for follow-up with PCP iron normal ferritin normal other chemistries normal proteins normal hematocrit hemoglobin markedly decreased follow-up history AK's, SCC.      Uses Efudex intermittently      Has still been using her tretinoin and azelaic acid is on minocycline for rosacea  Noted redness and irritation  As noted recently:  On minocycline for acne, rosacea.  Has used Retin-A 0.025% cream previously as well.  Tolerating has been outdoors in the sun does try to use sunscreen on the face    History of actinic keratoses, status post ENC of SCC at left shoulder in November 2020.  Had seen Dr. Sanchez recently who confirmed adequate treatment per current guidelines no further excision as no clinical evidence of persistent lesion.  Patient does have Efudex has not started this as recommended.  Again has not used the fluorouracil    Patient on long-term Plaquenil, methotrexate.  On Arimidex.    Lots of sun exposure in the past.  Minocycline has been working okay.  Had GI upset with doxycycline.  Uses topicals    Patient presents with concerns above.    Patient has been in their usual state of health.  History, medications, allergies reviewed as noted.      ROS:  Denies any other systemic complaints.  No new or changeing lesions other than noted above. No fevers, chills, night sweats, unusual sun sensitivity.  No other skin complaints.        History, medications, allergies reviewed as noted.       Physical Examination:     Well-developed well-nourished patient alert oriented in no acute distress.  Exam total-body performed, including scalp, head, neck, face,nails, hair, external eyes, including conjunctival mucosa, eyelids, lips external ears, back, chest,/ breasts, axillae,  abdomen, arms, legs, palms.     Multiple light to medium brown, well marginated, uniformly pigmented, macules and papules 6 mm and less scattered on exam.  pigmented lesions examined with dermoscopy benign-appearing patterns.     Waxy tannish keratotic papules scattered, cherry-red vascular papules scattered.    See map today's date for lesions noted .    Extensive lentigines, actinic damage.  Otherwise remarkable for lesions as noted on map.  See Assessment /Plan for additional history and physical exam also:    Assessment / plan:    No orders of the defined types were placed in this encounter.      Meds & Refills for this Visit:  Requested Prescriptions      No prescriptions requested or ordered in this encounter         Encounter Diagnoses   Name Primary?    AK (actinic keratosis) Yes    Lentigo     Seborrheic keratoses     Benign neoplasm of skin, unspecified location     Multiple nevi     Medication monitoring encounter     Encounter for follow-up surveillance of skin cancer     Rosacea      Patient seen for follow-up long-term monitoring, treatment of  AK's, skin cancer rosacea sun damage  Plan of care:  ongoing surveillance, monitoring including regular follow-up due to longer term risk of recurrence, new lesions.  See previous notes.      Erythematous scaling keratotic papules noted at sites noted on map  Actinic Keratoses.  Precancerous nature discussed. Sun protection, sunscreen/ blocks encouraged Lesions treated with cryo- .  Biopsy if not resolved.    Chest X3 left foreheadx4 total    Areas on chest improved.  Efudex to these areas twice weekly for 6 weeks    Repeat Efudex to these areas above nasal dorsum and chestEfudex instructions  Twice weekly for 6 weeks  Repeat course at least 2 areas of lesions treated      Hair loss, likely multifactorial stress, age medications.  Iron normal now however hemoglobin hematocrit still decreased hemoglobin 9.9.  Will continue supportive care, adequate nutrition iron supplements as recommended by PCP.  Overall consistent with age, pattern loss.  Overall hair regimen discussed including B. vitamin supplementation, biotin  to 10 mg daily-stop 3 days before any blood tests-, vitamin D3 2000 units daily, iron as appropriate, adequate protein intake (40 to 80 g daily), use of volumizing shampoos and antidandruff shampoos as appropriate.  Use of minoxidil 5% foam twice daily as tolerated if patient desires.  The fact this may take 2-4 months to see any significant change discussed.  The fact regimen better and maintaining hair rather than regrowth reviewed    Nothing else new and suspicious  SCC left anterior upper arm 1.5 cm at time of ENC treated with ENC 11/2020 no recurrence clinically presently or noted by Dr. Sanchez.  Would concur with recommendation for further fluorouracil.    Overall doing much better continue careful monitoring follow-up every 6 months  History of SCC, numerous AK's over the arms hands improving  Stable doing well     overall otherwise stable multiple benign keratoses noted  Waxy tan keratotic papules lesions in areas of concern as noted reassurance given.  Benign nature discussed.  Possibility of cryo, alphahydroxy acids over-the-counter retinol's discussed.    Suggest this on the chest as well given background of sun damage    In addition start tretinoin for chemoprevention in between courses of fluorouracil especially over the chest nose temples reviewed  Prominent solar elastosis noted  Continue careful sun protection patient using sunscreen inconsistently      Left pretibial leg lesion evaluated previously treated with cryo-verrucoid keratosis appears resolved monitor    More diffuse sun damage consider additional topicals.  Continue sunscreen, tretinoin    Acneiform papules over the cheeks chin jawline face.  Improved Retin-A.  Overall improved continue over-the-counter retinol's for lentigines over the cheeks discussed tretinoin, versus over-the-counter products   and chemoprevention as well as rosacea continue careful sun protection moisturizers.  Patient notes tretinoin very helpful for acne still  breaks out mask seems to make this worse.  Tolerating tretinoin without any problems    Rosacea, large inflammatory acne lesions doing well on minocycline continue current medication.  Consider tapering few larger inflammatory papular nodules at times.  Continue topicals also refill if needed see medications in grid.  Skin care regimen discussed at length including cleansers, makeup, face washing, sunscreen.  Recheck if continued flaring.  Notify us promptly if problems tolerating regimen.  Consider more aggressive therapy if not responding.  Discussed possible decrease in minocycline patient apprehensive we will see how she does over the fall consider decreasing to every other day    Lesions of concern waxy tan keratotic papules consistent with benign seborrheic keratoses over the back thigh legs axilla scalp    Multiple benign keratoses lentigines extensive lesions.  Sun damage moderate to severe especially chest arms.  Careful sun protection.  Discussed over-the-counter retinol's.  Alphahydroxy acids.  Sun protection.  Continue careful follow-up  Multiple benign keratoses.  No new suspicious lesions.    Extensive lentigines, sun damage.  Continue regular follow-up stressed again need for sun protection    Patient high risk for further skin cancers    Please refer to map for specific lesions.  See additional diagnoses.  Pros cons of various therapies, risks benefits discussed.Pathophysiology discussed with patient.  Therapeutic options reviewed.  See  Medications in grid.  Instructions reviewed at length.    Benign nevi, seborrheic  keratoses, cherry angiomas:  General skin care questions answered.   Reassurance regarding benign skin lesions.    Monitor for new or changing lesions. Signs and symptoms of skin cancer, ABCDE's of melanoma ( additional information available at AAD.org, skincancer.org) Encourage Sunscreen (broad-spectrum, ideally mineral-based-UVA/UVB -SPF 30 or higher) use encouraged, sun  protection/sun protective clothing, self exams reviewed Followup as noted RTC ---routine checkup 6 mos -one year or p.r.n.    Encounter Times   Including precharting, reviewing chart, prior notes obtaining history: 10 minutes, medical exam :10 minutes, notes on body map, plan, counseling 10minutes My total time spent caring for the patient on the day of the encounter: 30 minutes     The patient indicates understanding of these issues and agrees to the plan.  The patient is asked to return as noted in follow-up/ above.    This note was generated using Dragon voice recognition software.  Please contact me regarding any confusion resulting from errors in recognition..  Note to patient and family: The 21st Century Cures Act makes medical notes like these available to patients. However, be advised this is a medical document. It is intended as [id number] communication and monitoring of a patient's care needs. It is written in medical language and may contain abbreviations or verbiage that are unfamiliar. It may appear blunt or direct. Medical documents are intended to carry relevant information, facts as evident and the clinical opinion of the practitioner.

## 2024-12-10 RX ORDER — TRETINOIN 0.5 MG/G
CREAM TOPICAL
Qty: 45 G | Refills: 5 | Status: SHIPPED | OUTPATIENT
Start: 2024-12-10

## 2024-12-10 NOTE — TELEPHONE ENCOUNTER
Refill Request for medication(s): Tretinoin 0.05% cream    Last Office Visit: 1/13/24    Last Refill: 01/31/23    Pharmacy, Dosage verified: [id number] [address] AT [facility], [phone], [phone]     Condition Update (if applicable): Spoke to patient. She does need a refill on prescription. Will likely need a PA and may not get approved. Pt would still like us to try the PA (for management of pre-cancer) before using GoodRx. Reviewed how to use GoodRx with patient.     Rx pended and sent to provider for approval, please advise. Thank You!

## 2024-12-21 NOTE — TELEPHONE ENCOUNTER
Refill Request for medication(s):     Last Office Visit: 11/13/2024    Last Refill: 05/27/2024    Pharmacy, Dosage verified: yes    Condition Update (if applicable):     Rx pended and sent to provider for approval, please advise. Thank You!

## 2024-12-22 RX ORDER — MINOCYCLINE HYDROCHLORIDE 50 MG/1
CAPSULE ORAL
Qty: 60 CAPSULE | Refills: 1 | Status: SHIPPED | OUTPATIENT
Start: 2024-12-22

## 2025-02-10 ENCOUNTER — OFFICE VISIT (OUTPATIENT)
Dept: DERMATOLOGY CLINIC | Facility: CLINIC | Age: 75
End: 2025-02-10

## 2025-02-10 DIAGNOSIS — Z08 ENCOUNTER FOR FOLLOW-UP SURVEILLANCE OF SKIN CANCER: ICD-10-CM

## 2025-02-10 DIAGNOSIS — L71.9 ROSACEA: ICD-10-CM

## 2025-02-10 DIAGNOSIS — L82.1 SEBORRHEIC KERATOSES: ICD-10-CM

## 2025-02-10 DIAGNOSIS — Z85.828 ENCOUNTER FOR FOLLOW-UP SURVEILLANCE OF SKIN CANCER: ICD-10-CM

## 2025-02-10 DIAGNOSIS — Z51.81 MEDICATION MONITORING ENCOUNTER: ICD-10-CM

## 2025-02-10 DIAGNOSIS — D22.9 MULTIPLE NEVI: ICD-10-CM

## 2025-02-10 DIAGNOSIS — L81.4 LENTIGO: ICD-10-CM

## 2025-02-10 DIAGNOSIS — L57.8 SUN-DAMAGED SKIN: ICD-10-CM

## 2025-02-10 DIAGNOSIS — L57.0 AK (ACTINIC KERATOSIS): Primary | ICD-10-CM

## 2025-02-10 DIAGNOSIS — D23.9 BENIGN NEOPLASM OF SKIN, UNSPECIFIED LOCATION: ICD-10-CM

## 2025-02-10 PROCEDURE — 99213 OFFICE O/P EST LOW 20 MIN: CPT | Performed by: DERMATOLOGY

## 2025-02-10 PROCEDURE — 17000 DESTRUCT PREMALG LESION: CPT | Performed by: DERMATOLOGY

## 2025-02-10 PROCEDURE — 17003 DESTRUCT PREMALG LES 2-14: CPT | Performed by: DERMATOLOGY

## 2025-02-10 RX ORDER — AMMONIUM LACTATE 12 G/100G
CREAM TOPICAL
Qty: 385 G | Refills: 11 | Status: SHIPPED | OUTPATIENT
Start: 2025-02-10

## 2025-02-10 NOTE — PROGRESS NOTES
The following individual(s) verbally consented to be recorded using ambient AI listening technology and understand that they can each withdraw their consent to this listening technology at any point by asking the clinician to turn off or pause the recording:    Patient name: Alexandra Regalado Madhu  Additional names:  None

## 2025-02-16 NOTE — PROGRESS NOTES
Alexandra Leung is a 75 year old female.  HPI:     CC:    Chief Complaint   Patient presents with    Upper Body Exam     LOV 24- Pt presents for an Upper Body Skin Exam. Patient is following up to lesions on the chest and back. Previosuly treated with cryotherapy at last visit. Patient sporadically used the fluorouracil. Pt states lesions are still there and itchy at times. Pt also would like to discuss other treatment options for rosacea like red-light threapy- currently taking Minocycyline and using tretinoin. Has not been using the Finecea gel.    Personal Hx of SCC and Aks. Pt denies any family Hx of skin ca.          Allergies:  Adhesive tape and Dust mite extract    HISTORY:    Past Medical History:    Age-related nuclear cataract of both eyes    Allergic contact dermatitis of eyelids of both eyes    Breast cancer (HCC)    per [facility]:  lumpectomy    Eye exam due to high risk medication, encounter for    Long-term use of Plaquenil    Other and unspecified hyperlipidemia    Rosacea    SCC (squamous cell carcinoma), arm, left    left anterio upper arm      Past Surgical History:   Procedure Laterality Date    Colonoscopy        Family History   Problem Relation Age of Onset    Diabetes Mother     Glaucoma Neg     Macular degeneration Neg       Social History     Socioeconomic History    Marital status: Unknown   Tobacco Use    Smoking status: Former     Current packs/day: 0.00     Types: Cigarettes     Quit date: 1990     Years since quittin.1     Passive exposure: Past    Smokeless tobacco: Never   Vaping Use    Vaping status: Never Used   Substance and Sexual Activity    Alcohol use: Yes     Alcohol/week: 0.0 standard drinks of alcohol     Comment: 1-2 drinks a week     Drug use: No   Other Topics Concern    Grew up on a farm No    History of tanning Yes    Outdoor occupation No    Pt has a pacemaker No    Pt has a defibrillator No    Breast feeding No    Reaction to local anesthetic No      Social Drivers of Health     Food Insecurity: No Food Insecurity (5/6/2024)    Received from [facility]    Hunger Vital Sign     Worried About Running Out of Food in the Last Year: Never true     Ran Out of Food in the Last Year: Never true   Transportation Needs: Patient Declined (5/6/2024)    Received from [facility]    PRAPARE - Transportation     Lack of Transportation (Medical): Patient declined     Lack of Transportation (Non-Medical): Patient declined   Housing Stability: Patient Declined (5/6/2024)    Received from [facility]    Housing Stability Vital Sign     Unable to Pay for Housing in the Last Year: Patient declined     Unstable Housing in the Last Year: Patient declined        Current Outpatient Medications   Medication Sig Dispense Refill    Ammonium Lactate 12 % External Cream Apply to dry skin bid as directed 385 g 11    minocycline 50 MG Oral Cap TAKE 1 CAPSULE BY MOUTH DAILY 60 capsule 1    Tretinoin 0.05 % External Cream APPLY TO ENTIRE FACE AND CHEST FOR MANAGEMENT OF PRE-CANCER AT BEDTIME AS DIRECTED 45 g 5    escitalopram 5 MG Oral Tab Take 1 tablet (5 mg total) by mouth daily with food.      ROCKLATAN 0.02-0.005 % Ophthalmic Solution Place 1 drop into both eyes nightly.      mirtazapine 7.5 MG Oral Tab       buPROPion 75 MG Oral Tab Take 1 tablet (75 mg total) by mouth daily with breakfast.      clonazePAM 0.125 MG Oral Tablet Dispersible 1 tablet (0.125 mg total) daily as needed.      LUTEIN OR Take 1 tablet by mouth daily.      zolpidem 5 MG Oral Tab Take 1 tablet (5 mg total) by mouth nightly as needed for Sleep. AT BEDTIME      fluorouracil 5 % External Cream Use 2 times weekly for 6 weeks to forehead at hairline, and chest 40 g 2    PROLENSA 0.07 % Ophthalmic Solution       budesonide 3 MG Oral Cap DR Particles       ofloxacin 0.3 % Ophthalmic Solution       rosuvastatin 10 MG Oral Tab Take 1 tablet (10 mg total) by  mouth daily.      hydrocortisone 2.5 % External Cream Apply 1 Application topically 2 (two) times daily. Apply every morning and evening. 30 g 3    alendronate 70 MG Oral Tab Take 1 tablet (70 mg total) by mouth.      prednisoLONE acetate 1 % Ophthalmic Suspension       brimonidine Tartrate 0.2 % Ophthalmic Solution INT 1 GTT INTO OU BID  3    Ketotifen Fumarate 0.025 % Ophthalmic Solution 1 drop.      latanoprost 0.005 % Ophthalmic Solution INT 1 GTT INTO OU NIGHTLY  3    Timolol Maleate 0.5 % Ophthalmic Solution   3    loratadine 10 MG Oral Tab Take 1 tablet (10 mg total) by mouth daily.      multiple vitamin Oral Chew Tab Chew 1 tablet by mouth daily.      alprazolam (XANAX) 1 MG Oral Tab   4    anastrozole 1 MG Oral Tab tab Take by mouth.        Fluticasone Propionate (FLONASE) 50 MCG/ACT Nasal Suspension   11    Ipratropium Bromide (ATROVENT) 0.06 % Nasal Solution   1    simvastatin (ZOCOR) 10 MG Oral Tab   11    folic acid (FOLVITE) 1 MG Oral Tab   0    gabapentin (NEURONTIN) 300 MG Oral Cap   2    HYDROcodone-acetaminophen (NORCO) 5-325 MG Oral Tab   0    Lisinopril-Hydrochlorothiazide 10-12.5 MG Oral Tab   11    methotrexate (RHEUMATREX) 2.5 MG Oral Tab   1    naproxen (NAPROSYN) 500 MG Oral Tab   2    Sertraline HCl (ZOLOFT) 100 MG Oral Tab   11    metRONIDAZOLE 500 MG Oral Tab Take 1 tablet (500 mg total) by mouth 3 (three) times daily. (Patient not taking: Reported on 2/10/2025)      escitalopram 20 MG Oral Tab Take 1 tablet (20 mg total) by mouth daily with breakfast. (Patient not taking: Reported on 2/10/2025)      Azelaic Acid (FINACEA) 15 % External Gel APPLY TO AFFECTED AREA(S) EVERY MORNING (Patient not taking: Reported on 2/10/2025) 50 g 2    escitalopram 10 MG Oral Tab Take 1 tablet (10 mg total) by mouth daily. (Patient not taking: Reported on 2/10/2025)      tretinoin 0.025 % External Cream APPLY TO AFFECTED AREA(S) EVERY DAY (Patient not taking: Reported on 8/26/2024) 20 g 3    Minocycline HCl  Does not apply Powder Take by mouth. (Patient not taking: Reported on 2/10/2025)      alendronate 70 MG Oral Tab TK 1 T PO WEEKLY (Patient not taking: Reported on 2/10/2025)  2    erythromycin 5 MG/GM Ophthalmic Ointment  (Patient not taking: Reported on 2/10/2025)  3    hydrocortisone 2.5 % External Ointment Apply thin layer to affected area twice daily as needed for up to 2 weeks. (Patient not taking: Reported on 2/10/2025)      hydrocortisone 2.5 % External Ointment CARA THIN LAYER EXT AA BID FOR UP TO 2 WKS PRN (Patient not taking: Reported on 2/10/2025)  1    Neomycin-Polymyxin-Dexameth 3.5-17918-3.1 Ophthalmic Ointment  (Patient not taking: Reported on 2/10/2025)  0    GAVILYTE-G 236 g Oral Recon Soln TAKE 4000 ML PO AS ONE DOSE (Patient not taking: Reported on 2/10/2025)  0    Montelukast Sodium (SINGULAIR) 10 MG Oral Tab   0    Desonide (DESOWEN) 0.05 % Apply Externally Cream Apply topically. (Patient not taking: Reported on 2/10/2025)      triamcinolone acetonide (KENALOG) 0.1 % Apply Externally Cream Apply topically. (Patient not taking: Reported on 8/26/2024)      Zolpidem Tartrate (AMBIEN) 10 MG Oral Tab  (Patient not taking: Reported on 2/10/2025)  4     Allergies:   Allergies   Allergen Reactions    Adhesive Tape      CLOTH TAPE*    Dust Mite Extract      Molds, Grass, Trees       Past Medical History:    Age-related nuclear cataract of both eyes    Allergic contact dermatitis of eyelids of both eyes    Breast cancer (HCC)    per [facility]:  lumpectomy    Eye exam due to high risk medication, encounter for    Long-term use of Plaquenil    Other and unspecified hyperlipidemia    Rosacea    SCC (squamous cell carcinoma), arm, left    left anterio upper arm     Past Surgical History:   Procedure Laterality Date    Colonoscopy       Social History     Socioeconomic History    Marital status: Unknown     Spouse name: Not on file    Number of children: Not on file    Years of education: Not on file    Highest  education level: Not on file   Occupational History    Not on file   Tobacco Use    Smoking status: Former     Current packs/day: 0.00     Types: Cigarettes     Quit date: 1990     Years since quittin.1     Passive exposure: Past    Smokeless tobacco: Never   Vaping Use    Vaping status: Never Used   Substance and Sexual Activity    Alcohol use: Yes     Alcohol/week: 0.0 standard drinks of alcohol     Comment: 1-2 drinks a week     Drug use: No    Sexual activity: Not on file   Other Topics Concern    Grew up on a farm No    History of tanning Yes    Outdoor occupation No    Pt has a pacemaker No    Pt has a defibrillator No    Breast feeding No    Reaction to local anesthetic No   Social History Narrative    Not on file     Social Drivers of Health     Food Insecurity: No Food Insecurity (2024)    Received from [facility]    Hunger Vital Sign     Worried About Running Out of Food in the Last Year: Never true     Ran Out of Food in the Last Year: Never true   Transportation Needs: Patient Declined (2024)    Received from [facility]    PRAPARE - Transportation     Lack of Transportation (Medical): Patient declined     Lack of Transportation (Non-Medical): Patient declined   Stress: Not on file   Housing Stability: Patient Declined (2024)    Received from [facility]    Housing Stability Vital Sign     Unable to Pay for Housing in the Last Year: Patient declined     Number of Places Lived in the Last Year: Not on file     Unstable Housing in the Last Year: Patient declined     Family History   Problem Relation Age of Onset    Diabetes Mother     Glaucoma Neg     Macular degeneration Neg        There were no vitals filed for this visit.    HPI:    Chief Complaint   Patient presents with    Upper Body Exam     LOV 24- Pt presents for an Upper Body Skin Exam. Patient is following up to lesions on the chest and back. Previosuly  treated with cryotherapy at last visit. Patient sporadically used the fluorouracil. Pt states lesions are still there and itchy at times. Pt also would like to discuss other treatment options for rosacea like red-light threapy- currently taking Minocycyline and using tretinoin. Has not been using the Finecea gel.    Personal Hx of SCC and Aks. Pt denies any family Hx of skin ca.      New concern hair loss anterior scalp  Per patient weight has been stable nothing new or different.  Has noted slowly progressive thinning due for follow-up with PCP iron normal ferritin normal other chemistries normal proteins normal hematocrit hemoglobin markedly decreased follow-up history AK's, SCC.      Uses Efudex intermittently      Has still been using her tretinoin and azelaic acid is on minocycline for rosacea  Noted redness and irritation  As noted recently:  On minocycline for acne, rosacea.  Has used Retin-A 0.025% cream previously as well.  Tolerating has been outdoors in the sun does try to use sunscreen on the face    History of actinic keratoses, status post ENC of SCC at left shoulder in November 2020.  Had seen Dr. Sancehz recently who confirmed adequate treatment per current guidelines no further excision as no clinical evidence of persistent lesion.  Patient does have Efudex has not started this as recommended.  Again has not used the fluorouracil    Patient on long-term Plaquenil, methotrexate.  On Arimidex.    Lots of sun exposure in the past.  Minocycline has been working okay.  Had GI upset with doxycycline.  Uses topicals    History of Present Illness  Alexandra Leung is a 75 year old female who presents with concerns about her rosacea and skin lesions.    She has concerns about her rosacea, noting that it is 'kind of showing a little.' She has not been using her prescribed medication, Finacea, regularly. She recently found the medication in her drawer and has not been consistent with its use.    She  describes having 'warty things' on her skin, which she picks at, causing irritation. She is interested in over-the-counter treatments for these lesions and mentions a recommendation for a cream called 'Rough and Bumpy' by Ryan Conner.    She expresses concern about her toenails appearing 'ugly' due to wearing nail polish all summer. It is clarified that the appearance is due to the polish etching the surface of the nails.    She notes that her feet turn purple when she stands, although she does not experience swelling or pain. She is unsure if this is related to veins in her legs.    She mentions the presence of 'little hairs coming in' on the top of her head, indicating some hair regrowth, although she is not treating her entire scalp.      Patient presents with concerns above.    Patient has been in their usual state of health.  History, medications, allergies reviewed as noted.      ROS:  Denies any other systemic complaints.  No new or changeing lesions other than noted above. No fevers, chills, night sweats, unusual sun sensitivity.  No other skin complaints.        History, medications, allergies reviewed as noted.       Physical Examination:     Well-developed well-nourished patient alert oriented in no acute distress.  Exam total-body performed, including scalp, head, neck, face,nails, hair, external eyes, including conjunctival mucosa, eyelids, lips external ears, back, chest,/ breasts, axillae,  abdomen, arms, legs, palms.     Multiple light to medium brown, well marginated, uniformly pigmented, macules and papules 6 mm and less scattered on exam. pigmented lesions examined with dermoscopy benign-appearing patterns.     Waxy tannish keratotic papules scattered, cherry-red vascular papules scattered.    See map today's date for lesions noted .    Extensive lentigines, actinic damage.  Otherwise remarkable for lesions as noted on map.  See Assessment /Plan for additional history and physical exam  also:    Assessment / plan:    No orders of the defined types were placed in this encounter.      Meds & Refills for this Visit:  Requested Prescriptions     Signed Prescriptions Disp Refills    Ammonium Lactate 12 % External Cream 385 g 11     Sig: Apply to dry skin bid as directed         Encounter Diagnoses   Name Primary?    AK (actinic keratosis) Yes    Lentigo     Seborrheic keratoses     Benign neoplasm of skin, unspecified location     Multiple nevi     Medication monitoring encounter     Encounter for follow-up surveillance of skin cancer     Rosacea     Sun-damaged skin      Patient seen for follow-up long-term monitoring, treatment of  AK's, skin cancer rosacea sun damage  Plan of care:  ongoing surveillance, monitoring including regular follow-up due to longer term risk of recurrence, new lesions.  See previous notes.    Physical Exam  EXTREMITIES: Feet appear purple upon standing, no edema or pain.  SKIN: Improved rosacea, presence of keratosis, changes in nails consistent with polish use, no fungal infection.    Results        Assessment & Plan  Rosacea    Rosacea shows some improvement but remains present. Non-compliance with Finacea (azelaic acid) noted. Emphasized the importance of consistent use to reduce redness and inflammation with minimal side effects.    - Advise using Finacea (azelaic acid) at least once daily      Seborrheic Keratosis    Multiple rough, warty lesions consistent with seborrheic keratosis. Patient concerned and tends to pick at them. Discussed [employer] Rough & Bumpy cream to smooth lesions and improve skin appearance with no significant risks.    - Recommend [employer] Rough & Bumpy cream    - Advise applying the cream after showering      Leg Discoloration    Reports purple discoloration of feet when standing, without pain or swelling. Possible venous insufficiency. Discussed potential need for vascular surgeon evaluation to confirm diagnosis and discuss treatment options.     - Refer to vascular surgeon for further evaluation    - Provide contact information for Dr. Gonzalez      General Health Maintenance    Discussed general skin care and recommended lactic acid-based products like Amlactin for skin hydration and texture, if covered by insurance.    - Recommend using lactic acid-based products like Amlactin if covered by insurance      Follow-up    - Follow up with vascular surgeon as needed    - Continue using prescribed treatments and over-the-counter products as advised.      Erythematous scaling keratotic papules noted at sites noted on map  Actinic Keratoses.  Precancerous nature discussed. Sun protection, sunscreen/ blocks encouraged Lesions treated with cryo- .  Biopsy if not resolved.    Forehead chest X5  's nodules over the wrist forearms    Numerous benign keratoses recommend [employer] rough and bumpy or Lac-Hydrin cream  Areas on chest improved.  Efudex to these areas twice weekly for 6 weeks    Repeat Efudex to these areas above nasal dorsum and chestEfudex instructions  Twice weekly for 6 weeks  Repeat course at least 2 areas of lesions treated      Hair loss, likely multifactorial stress, age medications.  Iron normal now however hemoglobin hematocrit still decreased hemoglobin 9.9.  Will continue supportive care, adequate nutrition iron supplements as recommended by PCP.  Overall consistent with age, pattern loss.  Overall hair regimen discussed including B. vitamin supplementation, biotin to 10 mg daily-stop 3 days before any blood tests-, vitamin D3 2000 units daily, iron as appropriate, adequate protein intake (40 to 80 g daily), use of volumizing shampoos and antidandruff shampoos as appropriate.  Use of minoxidil 5% foam twice daily as tolerated if patient desires.  The fact this may take 2-4 months to see any significant change discussed.  The fact regimen better and maintaining hair rather than regrowth reviewed    Nothing else new and suspicious  SCC left  anterior upper arm 1.5 cm at time of ENC treated with ENC 11/2020 no recurrence clinically presently or noted by Dr. Sanchez.  Would concur with recommendation for further fluorouracil.    Overall doing much better continue careful monitoring follow-up every 6 months  History of SCC, numerous AK's over the arms hands improving  Stable doing well     overall otherwise stable multiple benign keratoses noted  Waxy tan keratotic papules lesions in areas of concern as noted reassurance given.  Benign nature discussed.  Possibility of cryo, alphahydroxy acids over-the-counter retinol's discussed.    Suggest this on the chest as well given background of sun damage    In addition start tretinoin for chemoprevention in between courses of fluorouracil especially over the chest nose temples reviewed  Prominent solar elastosis noted  Continue careful sun protection patient using sunscreen inconsistently      Left pretibial leg lesion evaluated previously treated with cryo-verrucoid keratosis appears resolved monitor    More diffuse sun damage consider additional topicals.  Continue sunscreen, tretinoin    Acneiform papules over the cheeks chin jawline face.  Improved Retin-A.  Overall improved continue over-the-counter retinol's for lentigines over the cheeks discussed tretinoin, versus over-the-counter products   and chemoprevention as well as rosacea continue careful sun protection moisturizers.  Patient notes tretinoin very helpful for acne still breaks out mask seems to make this worse.  Tolerating tretinoin without any problems    Rosacea, large inflammatory acne lesions doing well on minocycline continue current medication.  Consider tapering few larger inflammatory papular nodules at times.  Continue topicals also refill if needed see medications in grid.  Skin care regimen discussed at length including cleansers, makeup, face washing, sunscreen.  Recheck if continued flaring.  Notify us promptly if problems tolerating  regimen.  Consider more aggressive therapy if not responding.  Discussed possible decrease in minocycline patient apprehensive we will see how she does over the fall consider decreasing to every other day    Lesions of concern waxy tan keratotic papules consistent with benign seborrheic keratoses over the back thigh legs axilla scalp    Multiple benign keratoses lentigines extensive lesions.  Sun damage moderate to severe especially chest arms.  Careful sun protection.  Discussed over-the-counter retinol's.  Alphahydroxy acids.  Sun protection.  Continue careful follow-up  Multiple benign keratoses.  No new suspicious lesions.    Extensive lentigines, sun damage.  Continue regular follow-up stressed again need for sun protection    Patient high risk for further skin cancers    Please refer to map for specific lesions.  See additional diagnoses.  Pros cons of various therapies, risks benefits discussed.Pathophysiology discussed with patient.  Therapeutic options reviewed.  See  Medications in grid.  Instructions reviewed at length.    Benign nevi, seborrheic  keratoses, cherry angiomas:  General skin care questions answered.   Reassurance regarding benign skin lesions.    Monitor for new or changing lesions. Signs and symptoms of skin cancer, ABCDE's of melanoma ( additional information available at AAD.org, skincancer.org) Encourage Sunscreen (broad-spectrum, ideally mineral-based-UVA/UVB -SPF 30 or higher) use encouraged, sun protection/sun protective clothing, self exams reviewed Followup as noted RTC ---routine checkup 6 mos -one year or p.r.n.    Encounter Times   Including precharting, reviewing chart, prior notes obtaining history: 10 minutes, medical exam :10 minutes, notes on body map, plan, counseling 10minutes My total time spent caring for the patient on the day of the encounter: 30 minutes     The patient indicates understanding of these issues and agrees to the plan.  The patient is asked to return as  noted in follow-up/ above.    This note was generated using Dragon voice recognition software.  Please contact me regarding any confusion resulting from errors in recognition..  Note to patient and family: The 21st Century Cures Act makes medical notes like these available to patients. However, be advised this is a medical document. It is intended as [id number] communication and monitoring of a patient's care needs. It is written in medical language and may contain abbreviations or verbiage that are unfamiliar. It may appear blunt or direct. Medical documents are intended to carry relevant information, facts as evident and the clinical opinion of the practitioner.

## 2025-05-08 ENCOUNTER — OFFICE VISIT (OUTPATIENT)
Dept: DERMATOLOGY CLINIC | Facility: CLINIC | Age: 75
End: 2025-05-08

## 2025-05-08 DIAGNOSIS — D22.9 MULTIPLE NEVI: ICD-10-CM

## 2025-05-08 DIAGNOSIS — L71.9 ROSACEA: ICD-10-CM

## 2025-05-08 DIAGNOSIS — D23.9 BENIGN NEOPLASM OF SKIN, UNSPECIFIED LOCATION: ICD-10-CM

## 2025-05-08 DIAGNOSIS — Z85.828 ENCOUNTER FOR FOLLOW-UP SURVEILLANCE OF SKIN CANCER: ICD-10-CM

## 2025-05-08 DIAGNOSIS — L57.0 AK (ACTINIC KERATOSIS): Primary | ICD-10-CM

## 2025-05-08 DIAGNOSIS — Z51.81 MEDICATION MONITORING ENCOUNTER: ICD-10-CM

## 2025-05-08 DIAGNOSIS — L81.4 LENTIGO: ICD-10-CM

## 2025-05-08 DIAGNOSIS — L82.1 SEBORRHEIC KERATOSES: ICD-10-CM

## 2025-05-08 DIAGNOSIS — Z08 ENCOUNTER FOR FOLLOW-UP SURVEILLANCE OF SKIN CANCER: ICD-10-CM

## 2025-05-08 PROCEDURE — 99213 OFFICE O/P EST LOW 20 MIN: CPT | Performed by: DERMATOLOGY

## 2025-05-08 PROCEDURE — 17000 DESTRUCT PREMALG LESION: CPT | Performed by: DERMATOLOGY

## 2025-05-08 PROCEDURE — 17003 DESTRUCT PREMALG LES 2-14: CPT | Performed by: DERMATOLOGY

## 2025-05-08 NOTE — PROGRESS NOTES
The following individual(s) verbally consented to be recorded using ambient AI listening technology and understand that they can each withdraw their consent to this listening technology at any point by asking the clinician to turn off or pause the recording:    Patient name: Alexandra Regalado Madhu  Additional names:

## 2025-05-12 NOTE — PROGRESS NOTES
Alexandra Leung is a 75 year old female.  HPI:     CC:    Chief Complaint   Patient presents with    Full Skin Exam     Hx of SCC and Aks. LOV 2025.  Pt presents for FBSE. Lesion(S) of concern to the   1.Face -Denies bleeding or pain. Itchy   2.right lower leg -Denies bleeding or pain. Itchy   3.left thigh- painful, cryo at last visit.  Denies bleeding  4. back. Denies bleeding or pain. Itchy          Allergies:  Adhesive tape, Dust mite extract, and Seasonal    HISTORY:    Past Medical History:    Age-related nuclear cataract of both eyes    Allergic contact dermatitis of eyelids of both eyes    Breast cancer (HCC)    per [facility]:  lumpectomy    Eye exam due to high risk medication, encounter for    Long-term use of Plaquenil    Other and unspecified hyperlipidemia    Rosacea    SCC (squamous cell carcinoma), arm, left    left anterio upper arm      Past Surgical History:   Procedure Laterality Date    Colonoscopy        Family History   Problem Relation Age of Onset    Diabetes Mother     Glaucoma Neg     Macular degeneration Neg       Social History     Socioeconomic History    Marital status: Unknown   Tobacco Use    Smoking status: Former     Current packs/day: 0.00     Types: Cigarettes     Quit date: 1990     Years since quittin.3     Passive exposure: Past    Smokeless tobacco: Never   Vaping Use    Vaping status: Never Used   Substance and Sexual Activity    Alcohol use: Yes     Alcohol/week: 0.0 standard drinks of alcohol     Comment: 1-2 drinks a week     Drug use: No   Other Topics Concern    Grew up on a farm No    History of tanning Yes    Outdoor occupation No    Pt has a pacemaker No    Pt has a defibrillator No    Breast feeding No    Reaction to local anesthetic No     Social Drivers of Health     Food Insecurity: No Food Insecurity (2024)    Received from [facility]    Hunger Vital Sign     Worried About Running Out of Food in the Last Year: Never true      Ran Out of Food in the Last Year: Never true   Transportation Needs: Patient Declined (5/6/2024)    Received from [facility]    PRAPARE - Transportation     Lack of Transportation (Medical): Patient declined     Lack of Transportation (Non-Medical): Patient declined   Housing Stability: Patient Declined (5/6/2024)    Received from [facility]    Housing Stability Vital Sign     Unable to Pay for Housing in the Last Year: Patient declined     Unstable Housing in the Last Year: Patient declined        Current Outpatient Medications   Medication Sig Dispense Refill    Ammonium Lactate 12 % External Cream Apply to dry skin bid as directed 385 g 11    minocycline 50 MG Oral Cap TAKE 1 CAPSULE BY MOUTH DAILY 60 capsule 1    Tretinoin 0.05 % External Cream APPLY TO ENTIRE FACE AND CHEST FOR MANAGEMENT OF PRE-CANCER AT BEDTIME AS DIRECTED 45 g 5    escitalopram 5 MG Oral Tab Take 1 tablet (5 mg total) by mouth daily with food.      ROCKLATAN 0.02-0.005 % Ophthalmic Solution Place 1 drop into both eyes nightly.      mirtazapine 7.5 MG Oral Tab       buPROPion 75 MG Oral Tab Take 1 tablet (75 mg total) by mouth daily with breakfast.      clonazePAM 0.125 MG Oral Tablet Dispersible 1 tablet (0.125 mg total) daily as needed.      LUTEIN OR Take 1 tablet by mouth daily.      metRONIDAZOLE 500 MG Oral Tab Take 1 tablet (500 mg total) by mouth 3 (three) times daily. (Patient not taking: Reported on 5/8/2025)      escitalopram 20 MG Oral Tab Take 1 tablet (20 mg total) by mouth daily with breakfast. (Patient not taking: Reported on 5/8/2025)      zolpidem 5 MG Oral Tab Take 1 tablet (5 mg total) by mouth nightly as needed for Sleep. AT BEDTIME      Azelaic Acid (FINACEA) 15 % External Gel APPLY TO AFFECTED AREA(S) EVERY MORNING (Patient not taking: Reported on 5/8/2025) 50 g 2    fluorouracil 5 % External Cream Use 2 times weekly for 6 weeks to forehead at hairline, and chest 40 g 2     PROLENSA 0.07 % Ophthalmic Solution       budesonide 3 MG Oral Cap DR Particles       escitalopram 10 MG Oral Tab Take 1 tablet (10 mg total) by mouth daily. (Patient not taking: Reported on 5/8/2025)      ofloxacin 0.3 % Ophthalmic Solution       rosuvastatin 10 MG Oral Tab Take 1 tablet (10 mg total) by mouth daily.      hydrocortisone 2.5 % External Cream Apply 1 Application topically 2 (two) times daily. Apply every morning and evening. 30 g 3    tretinoin 0.025 % External Cream APPLY TO AFFECTED AREA(S) EVERY DAY (Patient not taking: Reported on 5/8/2025) 20 g 3    Minocycline HCl Does not apply Powder Take by mouth. (Patient not taking: Reported on 5/8/2025)      alendronate 70 MG Oral Tab Take 1 tablet (70 mg total) by mouth.      alendronate 70 MG Oral Tab TK 1 T PO WEEKLY (Patient not taking: Reported on 5/8/2025)  2    erythromycin 5 MG/GM Ophthalmic Ointment  (Patient not taking: Reported on 5/8/2025)  3    hydrocortisone 2.5 % External Ointment Apply thin layer to affected area twice daily as needed for up to 2 weeks. (Patient not taking: Reported on 5/8/2025)      hydrocortisone 2.5 % External Ointment CARA THIN LAYER EXT AA BID FOR UP TO 2 WKS PRN (Patient not taking: Reported on 5/8/2025)  1    Neomycin-Polymyxin-Dexameth 3.5-22801-5.1 Ophthalmic Ointment  (Patient not taking: Reported on 5/8/2025)  0    GAVILYTE-G 236 g Oral Recon Soln TAKE 4000 ML PO AS ONE DOSE (Patient not taking: Reported on 5/8/2025)  0    prednisoLONE acetate 1 % Ophthalmic Suspension       brimonidine Tartrate 0.2 % Ophthalmic Solution INT 1 GTT INTO OU BID  3    Ketotifen Fumarate 0.025 % Ophthalmic Solution 1 drop.      latanoprost 0.005 % Ophthalmic Solution INT 1 GTT INTO OU NIGHTLY  3    Timolol Maleate 0.5 % Ophthalmic Solution   3    loratadine 10 MG Oral Tab Take 1 tablet (10 mg total) by mouth daily.      multiple vitamin Oral Chew Tab Chew 1 tablet by mouth daily.      Montelukast Sodium (SINGULAIR) 10 MG Oral Tab   0     alprazolam (XANAX) 1 MG Oral Tab   4    Desonide (DESOWEN) 0.05 % Apply Externally Cream Apply topically. (Patient not taking: Reported on 2025)      triamcinolone acetonide (KENALOG) 0.1 % Apply Externally Cream Apply topically. (Patient not taking: Reported on 2025)      anastrozole 1 MG Oral Tab tab Take by mouth.        Fluticasone Propionate (FLONASE) 50 MCG/ACT Nasal Suspension   11    Ipratropium Bromide (ATROVENT) 0.06 % Nasal Solution   1    simvastatin (ZOCOR) 10 MG Oral Tab   11    folic acid (FOLVITE) 1 MG Oral Tab   0    gabapentin (NEURONTIN) 300 MG Oral Cap   2    HYDROcodone-acetaminophen (NORCO) 5-325 MG Oral Tab   0    Lisinopril-Hydrochlorothiazide 10-12.5 MG Oral Tab   11    methotrexate (RHEUMATREX) 2.5 MG Oral Tab   1    naproxen (NAPROSYN) 500 MG Oral Tab   2    Sertraline HCl (ZOLOFT) 100 MG Oral Tab   11    Zolpidem Tartrate (AMBIEN) 10 MG Oral Tab  (Patient not taking: Reported on 2025)  4     Allergies:   Allergies   Allergen Reactions    Adhesive Tape      CLOTH TAPE*    Dust Mite Extract      Molds, Grass, Trees    Seasonal UNKNOWN       Past Medical History:    Age-related nuclear cataract of both eyes    Allergic contact dermatitis of eyelids of both eyes    Breast cancer (HCC)    per [facility]:  lumpectomy    Eye exam due to high risk medication, encounter for    Long-term use of Plaquenil    Other and unspecified hyperlipidemia    Rosacea    SCC (squamous cell carcinoma), arm, left    left anterio upper arm     Past Surgical History:   Procedure Laterality Date    Colonoscopy       Social History     Socioeconomic History    Marital status: Unknown     Spouse name: Not on file    Number of children: Not on file    Years of education: Not on file    Highest education level: Not on file   Occupational History    Not on file   Tobacco Use    Smoking status: Former     Current packs/day: 0.00     Types: Cigarettes     Quit date: 1990     Years since quittin.3      Passive exposure: Past    Smokeless tobacco: Never   Vaping Use    Vaping status: Never Used   Substance and Sexual Activity    Alcohol use: Yes     Alcohol/week: 0.0 standard drinks of alcohol     Comment: 1-2 drinks a week     Drug use: No    Sexual activity: Not on file   Other Topics Concern    Grew up on a farm No    History of tanning Yes    Outdoor occupation No    Pt has a pacemaker No    Pt has a defibrillator No    Breast feeding No    Reaction to local anesthetic No   Social History Narrative    Not on file     Social Drivers of Health     Food Insecurity: No Food Insecurity (5/6/2024)    Received from [facility]    Hunger Vital Sign     Worried About Running Out of Food in the Last Year: Never true     Ran Out of Food in the Last Year: Never true   Transportation Needs: Patient Declined (5/6/2024)    Received from [facility]    PRAPARE - Transportation     Lack of Transportation (Medical): Patient declined     Lack of Transportation (Non-Medical): Patient declined   Stress: Not on file   Housing Stability: Patient Declined (5/6/2024)    Received from [facility]    Housing Stability Vital Sign     Unable to Pay for Housing in the Last Year: Patient declined     Number of Places Lived in the Last Year: Not on file     Unstable Housing in the Last Year: Patient declined     Family History   Problem Relation Age of Onset    Diabetes Mother     Glaucoma Neg     Macular degeneration Neg        There were no vitals filed for this visit.    HPI:    Chief Complaint   Patient presents with    Full Skin Exam     Hx of SCC and Aks. LOV 2/2025.  Pt presents for FBSE. Lesion(S) of concern to the   1.Face -Denies bleeding or pain. Itchy   2.right lower leg -Denies bleeding or pain. Itchy   3.left thigh- painful, cryo at last visit.  Denies bleeding  4. back. Denies bleeding or pain. Itchy      New concern hair loss anterior scalp  Per patient weight has  been stable nothing new or different.  Has noted slowly progressive thinning due for follow-up with PCP iron normal ferritin normal other chemistries normal proteins normal hematocrit hemoglobin markedly decreased follow-up history AK's, SCC.      Uses Efudex intermittently      Has still been using her tretinoin and azelaic acid is on minocycline for rosacea  Noted redness and irritation  As noted recently:  On minocycline for acne, rosacea.  Has used Retin-A 0.025% cream previously as well.  Tolerating has been outdoors in the sun does try to use sunscreen on the face    History of actinic keratoses, status post ENC of SCC at left shoulder in November 2020.  Had seen Dr. Sanchez recently who confirmed adequate treatment per current guidelines no further excision as no clinical evidence of persistent lesion.  Patient does have Efudex has not started this as recommended.  Again has not used the fluorouracil    Patient on long-term Plaquenil, methotrexate.  On Arimidex.    Lots of sun exposure in the past.  Minocycline has been working okay.  Had GI upset with doxycycline.  Uses topicals    History of Present Illness  Alexandra Leung is a 75 year old female who presents with concerns about her rosacea and skin lesions.    She has concerns about her rosacea, noting that it is 'kind of showing a little.' She has not been using her prescribed medication, Finacea, regularly. She recently found the medication in her drawer and has not been consistent with its use.    She describes having 'warty things' on her skin, which she picks at, causing irritation. She is interested in over-the-counter treatments for these lesions and mentions a recommendation for a cream called 'Rough and Bumpy' by Ryan Conner.    She expresses concern about her toenails appearing 'ugly' due to wearing nail polish all summer. It is clarified that the appearance is due to the polish etching the surface of the nails.    She notes that her feet  turn purple when she stands, although she does not experience swelling or pain. She is unsure if this is related to veins in her legs.    She mentions the presence of 'little hairs coming in' on the top of her head, indicating some hair regrowth, although she is not treating her entire scalp.      Alexandra Leung is a 75 year old female who presents with persistent itchy skin and age spots.    She experiences persistent pruritus, primarily affecting her chest and face, which she describes as 'itchy, itchy, itchy.' The pruritus has been present for a while and is associated with xerosis. She has been using hydrocortisone cream, but it has not resolved the issue.    She notes the presence of age spots, which she describes as 'horrible' and 'crusty,' and mentions that they have been present for a long time. She has tried various treatments, but they have not resolved. She is concerned about these spots potentially being precancerous.    She has been experiencing pustules and has resumed taking minocycline once a day, which she had taken previously without complete resolution of the pustules. She describes one lesion as itchy and sore, but not a boil, and suspects it might be a keratosis.    Her legs appear improved, and she plans to use compression socks. She is also walking every day, which she believes is beneficial.    No new symptoms apart from the persistent pruritus and xerosis. She confirms she is eating well, focusing on protein intake.      Patient presents with concerns above.    Patient has been in their usual state of health.  History, medications, allergies reviewed as noted.      ROS:  Denies any other systemic complaints.  No new or changeing lesions other than noted above. No fevers, chills, night sweats, unusual sun sensitivity.  No other skin complaints.        History, medications, allergies reviewed as noted.       Physical Examination:     Well-developed well-nourished patient alert oriented  in no acute distress.  Exam total-body performed, including scalp, head, neck, face,nails, hair, external eyes, including conjunctival mucosa, eyelids, lips external ears, back, chest,/ breasts, axillae,  abdomen, arms, legs, palms.     Multiple light to medium brown, well marginated, uniformly pigmented, macules and papules 6 mm and less scattered on exam. pigmented lesions examined with dermoscopy benign-appearing patterns.     Waxy tannish keratotic papules scattered, cherry-red vascular papules scattered.    See map today's date for lesions noted .    Extensive lentigines, actinic damage.  Otherwise remarkable for lesions as noted on map.  See Assessment /Plan for additional history and physical exam also:    Assessment / plan:    No orders of the defined types were placed in this encounter.      Meds & Refills for this Visit:  Requested Prescriptions      No prescriptions requested or ordered in this encounter         Encounter Diagnoses   Name Primary?    AK (actinic keratosis) Yes    Lentigo     Seborrheic keratoses     Benign neoplasm of skin, unspecified location     Multiple nevi     Encounter for follow-up surveillance of skin cancer     Medication monitoring encounter     Rosacea      Patient seen for follow-up long-term monitoring, treatment of  AK's, skin cancer rosacea sun damage  Plan of care:  ongoing surveillance, monitoring including regular follow-up due to longer term risk of recurrence, new lesions.  See previous notes.    Physical Exam  EXTREMITIES: Feet appear purple upon standing, no edema or pain.  SKIN: Improved rosacea, presence of keratosis, changes in nails consistent with polish use, no fungal infection.      SKIN: Keratosis on skin, non-boil, itchy and sore. Dry skin with keratosis. Frozen keratosis on skin.    Results  Procedure: Cryotherapy  Description: Cryotherapy was performed on a lesion on the right cheek. The lesion was frozen.    PATHOLOGY  Benign keratosis      Assessment &  Plan    2/25  Rosacea    Rosacea shows some improvement but remains present. Non-compliance with Finacea (azelaic acid) noted. Emphasized the importance of consistent use to reduce redness and inflammation with minimal side effects.    - Advise using Finacea (azelaic acid) at least once daily      Seborrheic Keratosis    Multiple rough, warty lesions consistent with seborrheic keratosis. Patient concerned and tends to pick at them. Discussed [employer] Rough & Bumpy cream to smooth lesions and improve skin appearance with no significant risks.    - Recommend [employer] Rough & Bumpy cream    - Advise applying the cream after showering      Leg Discoloration    Reports purple discoloration of feet when standing, without pain or swelling. Possible venous insufficiency. Discussed potential need for vascular surgeon evaluation to confirm diagnosis and discuss treatment options.    - Refer to vascular surgeon for further evaluation    - Provide contact information for Dr. Gonzalez      [facility]    Discussed general skin care and recommended lactic acid-based products like Amlactin for skin hydration and texture, if covered by insurance.    - Recommend using lactic acid-based products like Amlactin if covered by insurance      Follow-up    - Follow up with vascular surgeon as needed    - Continue using prescribed treatments and over-the-counter products as advised.    5/25  Actinic Keratosis  Presence of actinic keratosis on the right cheek with texture change. Long-standing lesion with potential precancerous nature.  - Cryotherapy performed on the lesion.  - Advise to avoid applying anything on the treated area except moisturizers.    Seborrheic Keratosis  Multiple seborrheic keratoses present, including one itchy and sore lesion. Lesions are benign. Consider removal if the itchy lesion persists.    Dry Skin  Complaints of pruritus and xerosis, particularly on the chest and face. Possible eczema  considered but attributed to dryness.  - Advise to continue moisturizing the skin.      Erythematous scaling keratotic papules noted at sites noted on map  Actinic Keratoses.  Precancerous nature discussed. Sun protection, sunscreen/ blocks encouraged Lesions treated with cryo- .  Biopsy if not resolved.    Right cheek, chest X2 total X3    Lesion of concern multiple benign keratoses on back thigh right lower leg    She has resumed minocycline for rosacea with flareup recently    's nodules over the wrist forearms    Numerous benign keratoses recommend [employer] rough and bumpy or Lac-Hydrin cream  Areas on chest improved.  Efudex to these areas twice weekly for 6 weeks    Repeat Efudex to these areas above nasal dorsum and chestEfudex instructions  Twice weekly for 6 weeks  Repeat course at least 2 areas of lesions treated      Hair loss, likely multifactorial stress, age medications.  Iron normal now however hemoglobin hematocrit still decreased hemoglobin 9.9.  Will continue supportive care, adequate nutrition iron supplements as recommended by PCP.  Overall consistent with age, pattern loss.  Overall hair regimen discussed including B. vitamin supplementation, biotin to 10 mg daily-stop 3 days before any blood tests-, vitamin D3 2000 units daily, iron as appropriate, adequate protein intake (40 to 80 g daily), use of volumizing shampoos and antidandruff shampoos as appropriate.  Use of minoxidil 5% foam twice daily as tolerated if patient desires.  The fact this may take 2-4 months to see any significant change discussed.  The fact regimen better and maintaining hair rather than regrowth reviewed    Nothing else new and suspicious  SCC left anterior upper arm 1.5 cm at time of ENC treated with ENC 11/2020 no recurrence clinically presently or noted by Dr. Sanchez.  Would concur with recommendation for further fluorouracil.    Overall doing much better continue careful monitoring follow-up every 6  months  History of SCC, numerous AK's over the arms hands improving  Stable doing well     overall otherwise stable multiple benign keratoses noted  Waxy tan keratotic papules lesions in areas of concern as noted reassurance given.  Benign nature discussed.  Possibility of cryo, alphahydroxy acids over-the-counter retinol's discussed.    Suggest this on the chest as well given background of sun damage    In addition start tretinoin for chemoprevention in between courses of fluorouracil especially over the chest nose temples reviewed  Prominent solar elastosis noted  Continue careful sun protection patient using sunscreen inconsistently      Left pretibial leg lesion evaluated previously treated with cryo-verrucoid keratosis appears resolved monitor    More diffuse sun damage consider additional topicals.  Continue sunscreen, tretinoin    Acneiform papules over the cheeks chin jawline face.  Improved Retin-A.  Overall improved continue over-the-counter retinol's for lentigines over the cheeks discussed tretinoin, versus over-the-counter products   and chemoprevention as well as rosacea continue careful sun protection moisturizers.  Patient notes tretinoin very helpful for acne still breaks out mask seems to make this worse.  Tolerating tretinoin without any problems    Rosacea, large inflammatory acne lesions doing well on minocycline continue current medication.  Consider tapering few larger inflammatory papular nodules at times.  Continue topicals also refill if needed see medications in grid.  Skin care regimen discussed at length including cleansers, makeup, face washing, sunscreen.  Recheck if continued flaring.  Notify us promptly if problems tolerating regimen.  Consider more aggressive therapy if not responding.  Discussed possible decrease in minocycline patient apprehensive we will see how she does over the fall consider decreasing to every other day    Lesions of concern waxy tan keratotic papules  consistent with benign seborrheic keratoses over the back thigh legs axilla scalp    Multiple benign keratoses lentigines extensive lesions.  Sun damage moderate to severe especially chest arms.  Careful sun protection.  Discussed over-the-counter retinol's.  Alphahydroxy acids.  Sun protection.  Continue careful follow-up  Multiple benign keratoses.  No new suspicious lesions.    Extensive lentigines, sun damage.  Continue regular follow-up stressed again need for sun protection    Patient high risk for further skin cancers    Please refer to map for specific lesions.  See additional diagnoses.  Pros cons of various therapies, risks benefits discussed.Pathophysiology discussed with patient.  Therapeutic options reviewed.  See  Medications in grid.  Instructions reviewed at length.    Benign nevi, seborrheic  keratoses, cherry angiomas:  General skin care questions answered.   Reassurance regarding benign skin lesions.    Monitor for new or changing lesions. Signs and symptoms of skin cancer, ABCDE's of melanoma ( additional information available at AAD.org, skincancer.org) Encourage Sunscreen (broad-spectrum, ideally mineral-based-UVA/UVB -SPF 30 or higher) use encouraged, sun protection/sun protective clothing, self exams reviewed Followup as noted RTC ---routine checkup 6 mos -one year or p.r.n.    Encounter Times   Including precharting, reviewing chart, prior notes obtaining history: 10 minutes, medical exam :10 minutes, notes on body map, plan, counseling 10minutes My total time spent caring for the patient on the day of the encounter: 30 minutes     The patient indicates understanding of these issues and agrees to the plan.  The patient is asked to return as noted in follow-up/ above.    This note was generated using Dragon voice recognition software.  Please contact me regarding any confusion resulting from errors in recognition..  Note to patient and family: The 21st Century Cures Act makes medical notes  like these available to patients. However, be advised this is a medical document. It is intended as [id number] communication and monitoring of a patient's care needs. It is written in medical language and may contain abbreviations or verbiage that are unfamiliar. It may appear blunt or direct. Medical documents are intended to carry relevant information, facts as evident and the clinical opinion of the practitioner.

## 2025-07-21 ENCOUNTER — TELEPHONE (OUTPATIENT)
Dept: DERMATOLOGY CLINIC | Facility: CLINIC | Age: 75
End: 2025-07-21

## 2025-07-21 NOTE — TELEPHONE ENCOUNTER
Patient states that her hair is falling out and the while spots in the chest area.  Wants to be seen sooner than October, 2025.

## 2025-07-23 ENCOUNTER — OFFICE VISIT (OUTPATIENT)
Dept: DERMATOLOGY CLINIC | Facility: CLINIC | Age: 75
End: 2025-07-23

## 2025-07-23 DIAGNOSIS — L71.9 ROSACEA: ICD-10-CM

## 2025-07-23 DIAGNOSIS — Z08 ENCOUNTER FOR FOLLOW-UP SURVEILLANCE OF SKIN CANCER: ICD-10-CM

## 2025-07-23 DIAGNOSIS — L81.4 LENTIGO: ICD-10-CM

## 2025-07-23 DIAGNOSIS — Z85.828 ENCOUNTER FOR FOLLOW-UP SURVEILLANCE OF SKIN CANCER: ICD-10-CM

## 2025-07-23 DIAGNOSIS — L57.0 AK (ACTINIC KERATOSIS): Primary | ICD-10-CM

## 2025-07-23 DIAGNOSIS — D23.9 BENIGN NEOPLASM OF SKIN, UNSPECIFIED LOCATION: ICD-10-CM

## 2025-07-23 DIAGNOSIS — D22.9 MULTIPLE NEVI: ICD-10-CM

## 2025-07-23 DIAGNOSIS — Z51.81 MEDICATION MONITORING ENCOUNTER: ICD-10-CM

## 2025-07-23 DIAGNOSIS — L82.1 SEBORRHEIC KERATOSES: ICD-10-CM

## 2025-07-23 DIAGNOSIS — L65.9 HAIR LOSS: ICD-10-CM

## 2025-07-23 PROCEDURE — 17003 DESTRUCT PREMALG LES 2-14: CPT | Performed by: DERMATOLOGY

## 2025-07-23 PROCEDURE — 17000 DESTRUCT PREMALG LESION: CPT | Performed by: DERMATOLOGY

## 2025-07-23 PROCEDURE — 99214 OFFICE O/P EST MOD 30 MIN: CPT | Performed by: DERMATOLOGY

## 2025-07-23 RX ORDER — MINOXIDIL 2.5 MG/1
1.25 TABLET ORAL DAILY
Qty: 15 TABLET | Refills: 2 | Status: SHIPPED | OUTPATIENT
Start: 2025-07-23

## (undated) NOTE — Clinical Note
June 14, 2017    Alfredo Jarrett MD  [address]     Patient:  Jazmyne Roper   YOB: 1950   Date of Visit: 6/14/2017       Dear Dr. Calin Smith MD:    Thank you for referring Temitope Danny to me for evalua topically. Disp:  Rfl:    anastrozole (ARIMIDEX) 1 MG Oral Tab tab Take  by mouth.  Disp:  Rfl:    Fluticasone Propionate (FLONASE) 50 MCG/ACT Nasal Suspension  Disp:  Rfl: 11   Ipratropium Bromide (ATROVENT) 0.06 % Nasal Solution  Disp:  Rfl: 1   simvastat Right Left    Disc Sloping margin, Temporal crescent Sloping margin, Temporal crescent    C/D Ratio 0.75 0.9    Macula Normal Normal    Vessels Normal Normal    Periphery Normal Normal            Refraction     Wearing Rx      Sphere Cylinder Axis   Righ Meds This Visit:    No prescriptions requested or ordered in this encounter        Follow up instructions:  Return for Next avail, VF,OCT and pachy w/ no MD, If glaucoma diagnostics are wnl, 1 yr dil EE.    6/14/2017  Scribed by: Ambar Salter MD

## (undated) NOTE — MR AVS SNAPSHOT
[place]  [address]  [phone]               Thank you for choosing us for your health care visit with Ambar Salter MD.  We are glad to serve you and happy to provide you with this summa Age-related nuclear cataract of both eyes  Discussed with patient that since her prescription has changed, she could be a \"natural monovision. \"  She could use her right eye for distance and her left eye for near and she could go without glasses.   She was Montelukast Sodium 10 MG Tabs   Commonly known as:  SINGULAIR           multiple vitamin Chew   Chew 1 tablet by mouth daily.            naproxen 500 MG Tabs   Commonly known as:  NAPROSYN           Sertraline HCl 100 MG Tabs   Commonly known as:  ZOLOFT including white bread, rice and pasta   Eat plenty of protein, keep the fat content low Sugars:  sodas and sports drinks, candies and desserts   Eat plenty of low-fat dairy products High fat meats and dairy   Choose whole grain products Foods high in sodiu